# Patient Record
Sex: FEMALE | Race: WHITE | NOT HISPANIC OR LATINO | Employment: FULL TIME | URBAN - METROPOLITAN AREA
[De-identification: names, ages, dates, MRNs, and addresses within clinical notes are randomized per-mention and may not be internally consistent; named-entity substitution may affect disease eponyms.]

---

## 2020-06-09 ENCOUNTER — OCCMED (OUTPATIENT)
Dept: URGENT CARE | Facility: CLINIC | Age: 25
End: 2020-06-09

## 2020-06-09 ENCOUNTER — APPOINTMENT (OUTPATIENT)
Dept: LAB | Facility: CLINIC | Age: 25
End: 2020-06-09

## 2020-06-09 ENCOUNTER — TRANSCRIBE ORDERS (OUTPATIENT)
Dept: ADMINISTRATIVE | Facility: HOSPITAL | Age: 25
End: 2020-06-09

## 2020-06-09 DIAGNOSIS — Z00.00 ENCOUNTER FOR ANNUAL GENERAL MEDICAL EXAMINATION WITHOUT ABNORMAL FINDINGS IN ADULT: Primary | ICD-10-CM

## 2020-06-09 DIAGNOSIS — Z02.1 PHYSICAL EXAM, PRE-EMPLOYMENT: Primary | ICD-10-CM

## 2020-06-09 DIAGNOSIS — Z02.1 PHYSICAL EXAM, PRE-EMPLOYMENT: ICD-10-CM

## 2020-06-09 PROCEDURE — 86480 TB TEST CELL IMMUN MEASURE: CPT

## 2020-06-09 PROCEDURE — 36415 COLL VENOUS BLD VENIPUNCTURE: CPT

## 2020-06-11 LAB
GAMMA INTERFERON BACKGROUND BLD IA-ACNC: 0.03 IU/ML
M TB IFN-G BLD-IMP: NEGATIVE
M TB IFN-G CD4+ BCKGRND COR BLD-ACNC: 0 IU/ML
M TB IFN-G CD4+ BCKGRND COR BLD-ACNC: 0 IU/ML
MITOGEN IGNF BCKGRD COR BLD-ACNC: >10 IU/ML

## 2020-11-20 DIAGNOSIS — Z20.822 EXPOSURE TO COVID-19 VIRUS: Primary | ICD-10-CM

## 2020-12-21 ENCOUNTER — IMMUNIZATIONS (OUTPATIENT)
Dept: FAMILY MEDICINE CLINIC | Facility: HOSPITAL | Age: 25
End: 2020-12-21

## 2020-12-21 DIAGNOSIS — Z23 ENCOUNTER FOR IMMUNIZATION: ICD-10-CM

## 2020-12-21 PROCEDURE — 91300 SARS-COV-2 / COVID-19 MRNA VACCINE (PFIZER-BIONTECH) 30 MCG: CPT

## 2020-12-21 PROCEDURE — 0001A SARS-COV-2 / COVID-19 MRNA VACCINE (PFIZER-BIONTECH) 30 MCG: CPT

## 2021-01-14 ENCOUNTER — IMMUNIZATIONS (OUTPATIENT)
Dept: FAMILY MEDICINE CLINIC | Facility: HOSPITAL | Age: 26
End: 2021-01-14

## 2021-01-14 DIAGNOSIS — Z23 ENCOUNTER FOR IMMUNIZATION: Primary | ICD-10-CM

## 2021-01-14 PROCEDURE — 0002A SARS-COV-2 / COVID-19 MRNA VACCINE (PFIZER-BIONTECH) 30 MCG: CPT

## 2021-01-14 PROCEDURE — 91300 SARS-COV-2 / COVID-19 MRNA VACCINE (PFIZER-BIONTECH) 30 MCG: CPT

## 2021-08-19 ENCOUNTER — APPOINTMENT (OUTPATIENT)
Dept: LAB | Facility: HOSPITAL | Age: 26
End: 2021-08-19

## 2021-08-19 DIAGNOSIS — Z00.8 HEALTH EXAMINATION IN POPULATION SURVEY: ICD-10-CM

## 2021-08-19 LAB
CHOLEST SERPL-MCNC: 178 MG/DL
EST. AVERAGE GLUCOSE BLD GHB EST-MCNC: 105 MG/DL
HBA1C MFR BLD: 5.3 %
HDLC SERPL-MCNC: 72 MG/DL
LDLC SERPL CALC-MCNC: 92 MG/DL
NONHDLC SERPL-MCNC: 106 MG/DL
TRIGL SERPL-MCNC: 68 MG/DL

## 2021-08-19 PROCEDURE — 36415 COLL VENOUS BLD VENIPUNCTURE: CPT

## 2021-08-19 PROCEDURE — 80061 LIPID PANEL: CPT

## 2021-08-19 PROCEDURE — 83036 HEMOGLOBIN GLYCOSYLATED A1C: CPT

## 2022-06-07 ENCOUNTER — HOSPITAL ENCOUNTER (EMERGENCY)
Facility: HOSPITAL | Age: 27
Discharge: HOME | End: 2022-06-07
Attending: EMERGENCY MEDICINE | Admitting: EMERGENCY MEDICINE

## 2022-06-07 VITALS
OXYGEN SATURATION: 100 % | HEART RATE: 110 BPM | HEIGHT: 65 IN | SYSTOLIC BLOOD PRESSURE: 137 MMHG | WEIGHT: 205 LBS | RESPIRATION RATE: 18 BRPM | TEMPERATURE: 97.6 F | BODY MASS INDEX: 34.16 KG/M2 | DIASTOLIC BLOOD PRESSURE: 89 MMHG

## 2022-06-07 DIAGNOSIS — R11.10 VOMITING: Primary | ICD-10-CM

## 2022-06-07 LAB
ALBUMIN SERPL BCP-MCNC: 4.8 G/DL (ref 3–5.2)
ALP SERPL-CCNC: 109 U/L (ref 43–122)
ALT SERPL W P-5'-P-CCNC: 33 U/L
ANION GAP SERPL CALCULATED.3IONS-SCNC: 9 MMOL/L (ref 5–14)
AST SERPL W P-5'-P-CCNC: 33 U/L (ref 14–36)
BASOPHILS # BLD AUTO: 0.02 THOUSANDS/ΜL (ref 0–0.1)
BASOPHILS NFR BLD AUTO: 0 % (ref 0–1)
BILIRUB SERPL-MCNC: 0.55 MG/DL
BUN SERPL-MCNC: 8 MG/DL (ref 5–25)
CALCIUM SERPL-MCNC: 9.4 MG/DL (ref 8.4–10.2)
CHLORIDE SERPL-SCNC: 104 MMOL/L (ref 97–108)
CO2 SERPL-SCNC: 26 MMOL/L (ref 22–30)
CREAT SERPL-MCNC: 0.65 MG/DL (ref 0.6–1.2)
EOSINOPHIL # BLD AUTO: 0.05 THOUSAND/ΜL (ref 0–0.61)
EOSINOPHIL NFR BLD AUTO: 0 % (ref 0–6)
ERYTHROCYTE [DISTWIDTH] IN BLOOD BY AUTOMATED COUNT: 13.1 % (ref 11.6–15.1)
GFR SERPL CREATININE-BSD FRML MDRD: 121 ML/MIN/1.73SQ M
GLUCOSE SERPL-MCNC: 101 MG/DL (ref 70–99)
HCT VFR BLD AUTO: 46.4 % (ref 34.8–46.1)
HGB BLD-MCNC: 15.2 G/DL (ref 11.5–15.4)
IMM GRANULOCYTES # BLD AUTO: 0.02 THOUSAND/UL (ref 0–0.2)
IMM GRANULOCYTES NFR BLD AUTO: 0 % (ref 0–2)
LIPASE SERPL-CCNC: 80 U/L (ref 23–300)
LYMPHOCYTES # BLD AUTO: 3.24 THOUSANDS/ΜL (ref 0.6–4.47)
LYMPHOCYTES NFR BLD AUTO: 27 % (ref 14–44)
MCH RBC QN AUTO: 29.8 PG (ref 26.8–34.3)
MCHC RBC AUTO-ENTMCNC: 32.8 G/DL (ref 31.4–37.4)
MCV RBC AUTO: 91 FL (ref 82–98)
MONOCYTES # BLD AUTO: 0.65 THOUSAND/ΜL (ref 0.17–1.22)
MONOCYTES NFR BLD AUTO: 5 % (ref 4–12)
NEUTROPHILS # BLD AUTO: 8.2 THOUSANDS/ΜL (ref 1.85–7.62)
NEUTS SEG NFR BLD AUTO: 68 % (ref 43–75)
NRBC BLD AUTO-RTO: 0 /100 WBCS
PLATELET # BLD AUTO: 400 THOUSANDS/UL (ref 149–390)
PMV BLD AUTO: 10.2 FL (ref 8.9–12.7)
POTASSIUM SERPL-SCNC: 3.9 MMOL/L (ref 3.6–5)
PROT SERPL-MCNC: 8.9 G/DL (ref 5.9–8.4)
RBC # BLD AUTO: 5.1 MILLION/UL (ref 3.81–5.12)
SODIUM SERPL-SCNC: 139 MMOL/L (ref 137–147)
WBC # BLD AUTO: 12.18 THOUSAND/UL (ref 4.31–10.16)

## 2022-06-07 PROCEDURE — 85025 COMPLETE CBC W/AUTO DIFF WBC: CPT

## 2022-06-07 PROCEDURE — 80053 COMPREHEN METABOLIC PANEL: CPT

## 2022-06-07 PROCEDURE — 96374 THER/PROPH/DIAG INJ IV PUSH: CPT

## 2022-06-07 PROCEDURE — 99284 EMERGENCY DEPT VISIT MOD MDM: CPT

## 2022-06-07 PROCEDURE — 99283 EMERGENCY DEPT VISIT LOW MDM: CPT

## 2022-06-07 PROCEDURE — 36415 COLL VENOUS BLD VENIPUNCTURE: CPT

## 2022-06-07 PROCEDURE — 83690 ASSAY OF LIPASE: CPT

## 2022-06-07 RX ORDER — ONDANSETRON 2 MG/ML
4 INJECTION INTRAMUSCULAR; INTRAVENOUS ONCE
Status: COMPLETED | OUTPATIENT
Start: 2022-06-07 | End: 2022-06-07

## 2022-06-07 RX ORDER — ONDANSETRON 4 MG/1
4 TABLET, ORALLY DISINTEGRATING ORAL ONCE
Status: DISCONTINUED | OUTPATIENT
Start: 2022-06-07 | End: 2022-06-07

## 2022-06-07 RX ADMIN — ONDANSETRON 4 MG: 2 INJECTION INTRAMUSCULAR; INTRAVENOUS at 20:50

## 2022-06-08 NOTE — ED ATTENDING ATTESTATION
I was the attending physician on duty at the time the patient visited the emergency department  The patient was evaluated and dispositioned by the APC  I was personally available for consultation  I am administratively signing the chart after the fact      Kisha Villegas MD

## 2022-09-02 ENCOUNTER — APPOINTMENT (OUTPATIENT)
Dept: LAB | Facility: HOSPITAL | Age: 27
End: 2022-09-02

## 2022-09-02 DIAGNOSIS — Z00.8 ENCOUNTER FOR OTHER GENERAL EXAMINATION: ICD-10-CM

## 2022-09-02 LAB
CHOLEST SERPL-MCNC: 200 MG/DL
EST. AVERAGE GLUCOSE BLD GHB EST-MCNC: 111 MG/DL
HBA1C MFR BLD: 5.5 %
HDLC SERPL-MCNC: 71 MG/DL
LDLC SERPL CALC-MCNC: 108 MG/DL
NONHDLC SERPL-MCNC: 129 MG/DL
TRIGL SERPL-MCNC: 106 MG/DL

## 2022-09-02 PROCEDURE — 36415 COLL VENOUS BLD VENIPUNCTURE: CPT

## 2022-09-02 PROCEDURE — 83036 HEMOGLOBIN GLYCOSYLATED A1C: CPT

## 2022-09-02 PROCEDURE — 80061 LIPID PANEL: CPT

## 2023-08-30 ENCOUNTER — APPOINTMENT (OUTPATIENT)
Dept: LAB | Facility: HOSPITAL | Age: 28
End: 2023-08-30

## 2023-08-30 DIAGNOSIS — Z00.8 ENCOUNTER FOR OTHER GENERAL EXAMINATION: ICD-10-CM

## 2023-08-30 LAB
CHOLEST SERPL-MCNC: 159 MG/DL
EST. AVERAGE GLUCOSE BLD GHB EST-MCNC: 123 MG/DL
HBA1C MFR BLD: 5.9 %
HDLC SERPL-MCNC: 71 MG/DL
LDLC SERPL CALC-MCNC: 67 MG/DL (ref 0–100)
NONHDLC SERPL-MCNC: 88 MG/DL
TRIGL SERPL-MCNC: 105 MG/DL

## 2023-08-30 PROCEDURE — 80061 LIPID PANEL: CPT

## 2023-08-30 PROCEDURE — 36415 COLL VENOUS BLD VENIPUNCTURE: CPT

## 2023-08-30 PROCEDURE — 83036 HEMOGLOBIN GLYCOSYLATED A1C: CPT

## 2023-09-27 ENCOUNTER — TELEPHONE (OUTPATIENT)
Dept: BARIATRICS | Facility: CLINIC | Age: 28
End: 2023-09-27

## 2023-09-27 NOTE — TELEPHONE ENCOUNTER
Called patient and left a message to give the office a call back to schedule a NP appointment. She was referred by [facility] per her request. Referral for Weight Management has been scanned into her Chart.

## 2023-11-10 ENCOUNTER — OFFICE VISIT (OUTPATIENT)
Dept: BARIATRICS | Facility: CLINIC | Age: 28
End: 2023-11-10
Payer: COMMERCIAL

## 2023-11-10 VITALS
HEART RATE: 91 BPM | BODY MASS INDEX: 41.22 KG/M2 | HEIGHT: 62 IN | WEIGHT: 224 LBS | SYSTOLIC BLOOD PRESSURE: 116 MMHG | RESPIRATION RATE: 16 BRPM | DIASTOLIC BLOOD PRESSURE: 87 MMHG

## 2023-11-10 DIAGNOSIS — E66.01 OBESITY, CLASS III, BMI 40-49.9 (MORBID OBESITY) (HCC): Primary | ICD-10-CM

## 2023-11-10 DIAGNOSIS — R73.03 PREDIABETES: ICD-10-CM

## 2023-11-10 DIAGNOSIS — F90.9 ADHD: ICD-10-CM

## 2023-11-10 PROBLEM — E66.813 OBESITY, CLASS III, BMI 40-49.9 (MORBID OBESITY): Status: ACTIVE | Noted: 2023-11-10

## 2023-11-10 PROBLEM — J45.909 ASTHMA: Status: ACTIVE | Noted: 2023-11-10

## 2023-11-10 PROCEDURE — 99204 OFFICE O/P NEW MOD 45 MIN: CPT | Performed by: PHYSICIAN ASSISTANT

## 2023-11-10 RX ORDER — HYDROXYZINE HYDROCHLORIDE 10 MG/1
TABLET, FILM COATED ORAL
COMMUNITY

## 2023-11-10 RX ORDER — AMOXICILLIN 500 MG/1
500 CAPSULE ORAL 2 TIMES DAILY
COMMUNITY
Start: 2023-11-05 | End: 2023-11-15

## 2023-11-10 RX ORDER — DEXTROAMPHETAMINE SACCHARATE, AMPHETAMINE ASPARTATE, DEXTROAMPHETAMINE SULFATE AND AMPHETAMINE SULFATE 5; 5; 5; 5 MG/1; MG/1; MG/1; MG/1
TABLET ORAL
COMMUNITY
Start: 2016-01-01

## 2023-11-10 RX ORDER — CETIRIZINE HYDROCHLORIDE 10 MG/1
10 TABLET ORAL DAILY
COMMUNITY
Start: 2019-02-20

## 2023-11-10 RX ORDER — OMEGA-3S/DHA/EPA/FISH OIL/D3 300MG-1000
CAPSULE ORAL DAILY
COMMUNITY

## 2023-11-10 RX ORDER — METFORMIN HYDROCHLORIDE 750 MG/1
TABLET, EXTENDED RELEASE ORAL
Qty: 60 TABLET | Refills: 2 | Status: SHIPPED | OUTPATIENT
Start: 2023-11-10

## 2023-11-10 RX ORDER — MOMETASONE FUROATE 1 MG/ML
1 SOLUTION TOPICAL DAILY
COMMUNITY
Start: 2023-10-31 | End: 2024-10-30

## 2023-11-10 NOTE — ASSESSMENT & PLAN NOTE
-Discussed options of HealthyCORE-Intensive Lifestyle Intervention Program, Very Low Calorie Diet-VLCD, Conservative Program, Evangelist-En-Y Gastric Bypass, and Vertical Sleeve Gastrectomy and the role of weight loss medications. Explained the importance of making lifestyle changes if utilizing medication to aid in weight loss  -Initial weight loss goal of 5-10% weight loss for improved health  -Screening labs and records reviewed from prior  - STOP BANG-2/8    -Patient is interested in pursuing conservative plan    Goals:  -Food log (ie.) [email or web address],[employer],Andre Phillipe-[email or web address]  - To drink at least 64oz of water daily. No sugary beverages.  -Increase physical activity by 10 minutes daily. Gradually increase physical activity to a goal of 5 days per week for 30 minutes of MODERATE intensity PLUS 2 days per week of FULL BODY resistance training    Discussed medication options and will need to avoid phentermine due to adderall use. She was on wellbutrin prior and had side effects. To start on metformin for weight loss and prediabetes. Side effects discussed.      Initial [id number]   Goal Weight:145lb

## 2024-01-02 DIAGNOSIS — Z00.6 ENCOUNTER FOR EXAMINATION FOR NORMAL COMPARISON OR CONTROL IN CLINICAL RESEARCH PROGRAM: ICD-10-CM

## 2024-03-08 ENCOUNTER — TELEPHONE (OUTPATIENT)
Dept: FAMILY MEDICINE CLINIC | Facility: CLINIC | Age: 29
End: 2024-03-08

## 2024-03-08 ENCOUNTER — APPOINTMENT (OUTPATIENT)
Dept: LAB | Age: 29
End: 2024-03-08
Payer: COMMERCIAL

## 2024-03-08 ENCOUNTER — OFFICE VISIT (OUTPATIENT)
Dept: FAMILY MEDICINE CLINIC | Facility: CLINIC | Age: 29
End: 2024-03-08
Payer: COMMERCIAL

## 2024-03-08 VITALS
RESPIRATION RATE: 16 BRPM | SYSTOLIC BLOOD PRESSURE: 114 MMHG | DIASTOLIC BLOOD PRESSURE: 60 MMHG | OXYGEN SATURATION: 99 % | HEIGHT: 62 IN | HEART RATE: 84 BPM | BODY MASS INDEX: 41.77 KG/M2 | TEMPERATURE: 97.7 F | WEIGHT: 227 LBS

## 2024-03-08 DIAGNOSIS — R05.1 ACUTE COUGH: ICD-10-CM

## 2024-03-08 DIAGNOSIS — F90.0 ATTENTION DEFICIT HYPERACTIVITY DISORDER (ADHD), PREDOMINANTLY INATTENTIVE TYPE: ICD-10-CM

## 2024-03-08 DIAGNOSIS — R73.9 HYPERGLYCEMIA: ICD-10-CM

## 2024-03-08 DIAGNOSIS — E66.01 CLASS 3 SEVERE OBESITY DUE TO EXCESS CALORIES WITHOUT SERIOUS COMORBIDITY WITH BODY MASS INDEX (BMI) OF 40.0 TO 44.9 IN ADULT (HCC): ICD-10-CM

## 2024-03-08 DIAGNOSIS — F33.1 MDD (MAJOR DEPRESSIVE DISORDER), RECURRENT EPISODE, MODERATE (HCC): ICD-10-CM

## 2024-03-08 DIAGNOSIS — E53.8 VITAMIN B12 DEFICIENCY: ICD-10-CM

## 2024-03-08 DIAGNOSIS — Z00.01 ABNORMAL WELLNESS EXAM: ICD-10-CM

## 2024-03-08 DIAGNOSIS — F41.9 ANXIETY: ICD-10-CM

## 2024-03-08 DIAGNOSIS — Z00.6 ENCOUNTER FOR EXAMINATION FOR NORMAL COMPARISON OR CONTROL IN CLINICAL RESEARCH PROGRAM: ICD-10-CM

## 2024-03-08 DIAGNOSIS — R73.03 PREDIABETES: ICD-10-CM

## 2024-03-08 DIAGNOSIS — R11.0 NAUSEA: ICD-10-CM

## 2024-03-08 DIAGNOSIS — F90.0 ATTENTION DEFICIT HYPERACTIVITY DISORDER (ADHD), PREDOMINANTLY INATTENTIVE TYPE: Primary | ICD-10-CM

## 2024-03-08 DIAGNOSIS — J30.89 NON-SEASONAL ALLERGIC RHINITIS DUE TO OTHER ALLERGIC TRIGGER: ICD-10-CM

## 2024-03-08 PROBLEM — J30.2 SEASONAL ALLERGIES: Status: ACTIVE | Noted: 2023-03-08

## 2024-03-08 PROBLEM — E66.813 CLASS 3 SEVERE OBESITY DUE TO EXCESS CALORIES WITHOUT SERIOUS COMORBIDITY WITH BODY MASS INDEX (BMI) OF 40.0 TO 44.9 IN ADULT (HCC): Status: ACTIVE | Noted: 2024-03-08

## 2024-03-08 LAB
ALBUMIN SERPL BCP-MCNC: 4.5 G/DL (ref 3.5–5)
ALP SERPL-CCNC: 100 U/L (ref 34–104)
ALT SERPL W P-5'-P-CCNC: 27 U/L (ref 7–52)
ANION GAP SERPL CALCULATED.3IONS-SCNC: 9 MMOL/L
AST SERPL W P-5'-P-CCNC: 21 U/L (ref 13–39)
BASOPHILS # BLD AUTO: 0.06 THOUSANDS/ÂΜL (ref 0–0.1)
BASOPHILS NFR BLD AUTO: 1 % (ref 0–1)
BILIRUB SERPL-MCNC: 0.74 MG/DL (ref 0.2–1)
BUN SERPL-MCNC: 7 MG/DL (ref 5–25)
CALCIUM SERPL-MCNC: 9.6 MG/DL (ref 8.4–10.2)
CHLORIDE SERPL-SCNC: 102 MMOL/L (ref 96–108)
CHOLEST SERPL-MCNC: 153 MG/DL
CO2 SERPL-SCNC: 27 MMOL/L (ref 21–32)
CREAT SERPL-MCNC: 0.73 MG/DL (ref 0.6–1.3)
EOSINOPHIL # BLD AUTO: 0.15 THOUSAND/ÂΜL (ref 0–0.61)
EOSINOPHIL NFR BLD AUTO: 1 % (ref 0–6)
ERYTHROCYTE [DISTWIDTH] IN BLOOD BY AUTOMATED COUNT: 13.1 % (ref 11.6–15.1)
EST. AVERAGE GLUCOSE BLD GHB EST-MCNC: 126 MG/DL
GFR SERPL CREATININE-BSD FRML MDRD: 111 ML/MIN/1.73SQ M
GLUCOSE P FAST SERPL-MCNC: 87 MG/DL (ref 65–99)
HBA1C MFR BLD: 6 %
HCT VFR BLD AUTO: 40.2 % (ref 34.8–46.1)
HDLC SERPL-MCNC: 73 MG/DL
HGB BLD-MCNC: 13.4 G/DL (ref 11.5–15.4)
IMM GRANULOCYTES # BLD AUTO: 0.03 THOUSAND/UL (ref 0–0.2)
IMM GRANULOCYTES NFR BLD AUTO: 0 % (ref 0–2)
INSULIN SERPL-ACNC: 6.4 UIU/ML (ref 1.9–23)
LDLC SERPL CALC-MCNC: 68 MG/DL (ref 0–100)
LYMPHOCYTES # BLD AUTO: 3.96 THOUSANDS/ÂΜL (ref 0.6–4.47)
LYMPHOCYTES NFR BLD AUTO: 31 % (ref 14–44)
MCH RBC QN AUTO: 29.5 PG (ref 26.8–34.3)
MCHC RBC AUTO-ENTMCNC: 33.3 G/DL (ref 31.4–37.4)
MCV RBC AUTO: 89 FL (ref 82–98)
MONOCYTES # BLD AUTO: 1.09 THOUSAND/ÂΜL (ref 0.17–1.22)
MONOCYTES NFR BLD AUTO: 9 % (ref 4–12)
NEUTROPHILS # BLD AUTO: 7.51 THOUSANDS/ÂΜL (ref 1.85–7.62)
NEUTS SEG NFR BLD AUTO: 58 % (ref 43–75)
NRBC BLD AUTO-RTO: 0 /100 WBCS
PLATELET # BLD AUTO: 445 THOUSANDS/UL (ref 149–390)
PMV BLD AUTO: 10.7 FL (ref 8.9–12.7)
POTASSIUM SERPL-SCNC: 3.5 MMOL/L (ref 3.5–5.3)
PROT SERPL-MCNC: 7.9 G/DL (ref 6.4–8.4)
RBC # BLD AUTO: 4.54 MILLION/UL (ref 3.81–5.12)
SODIUM SERPL-SCNC: 138 MMOL/L (ref 135–147)
TRIGL SERPL-MCNC: 59 MG/DL
TSH SERPL DL<=0.05 MIU/L-ACNC: 2.95 UIU/ML (ref 0.45–4.5)
VIT B12 SERPL-MCNC: 602 PG/ML (ref 180–914)
WBC # BLD AUTO: 12.8 THOUSAND/UL (ref 4.31–10.16)

## 2024-03-08 PROCEDURE — 99395 PREV VISIT EST AGE 18-39: CPT | Performed by: FAMILY MEDICINE

## 2024-03-08 PROCEDURE — 80061 LIPID PANEL: CPT

## 2024-03-08 PROCEDURE — 80053 COMPREHEN METABOLIC PANEL: CPT

## 2024-03-08 PROCEDURE — 82607 VITAMIN B-12: CPT

## 2024-03-08 PROCEDURE — 84443 ASSAY THYROID STIM HORMONE: CPT

## 2024-03-08 PROCEDURE — 36415 COLL VENOUS BLD VENIPUNCTURE: CPT

## 2024-03-08 PROCEDURE — 99204 OFFICE O/P NEW MOD 45 MIN: CPT | Performed by: FAMILY MEDICINE

## 2024-03-08 PROCEDURE — 83525 ASSAY OF INSULIN: CPT

## 2024-03-08 PROCEDURE — 83036 HEMOGLOBIN GLYCOSYLATED A1C: CPT

## 2024-03-08 PROCEDURE — 85025 COMPLETE CBC W/AUTO DIFF WBC: CPT

## 2024-03-08 RX ORDER — DEXTROAMPHETAMINE SACCHARATE, AMPHETAMINE ASPARTATE MONOHYDRATE, DEXTROAMPHETAMINE SULFATE AND AMPHETAMINE SULFATE 5; 5; 5; 5 MG/1; MG/1; MG/1; MG/1
20 CAPSULE, EXTENDED RELEASE ORAL EVERY MORNING
COMMUNITY
Start: 2024-01-31 | End: 2024-03-08 | Stop reason: SDUPTHER

## 2024-03-08 RX ORDER — DEXTROAMPHETAMINE SACCHARATE, AMPHETAMINE ASPARTATE MONOHYDRATE, DEXTROAMPHETAMINE SULFATE AND AMPHETAMINE SULFATE 5; 5; 5; 5 MG/1; MG/1; MG/1; MG/1
20 CAPSULE, EXTENDED RELEASE ORAL EVERY MORNING
Qty: 30 CAPSULE | Refills: 0 | Status: SHIPPED | OUTPATIENT
Start: 2024-03-08 | End: 2024-03-08 | Stop reason: SDUPTHER

## 2024-03-08 RX ORDER — DEXTROAMPHETAMINE SACCHARATE, AMPHETAMINE ASPARTATE, DEXTROAMPHETAMINE SULFATE AND AMPHETAMINE SULFATE 5; 5; 5; 5 MG/1; MG/1; MG/1; MG/1
TABLET ORAL
Qty: 30 TABLET | Refills: 0 | Status: SHIPPED | OUTPATIENT
Start: 2024-03-08 | End: 2024-03-08 | Stop reason: SDUPTHER

## 2024-03-08 RX ORDER — FLUTICASONE PROPIONATE 220 UG/1
2 AEROSOL, METERED RESPIRATORY (INHALATION) 2 TIMES DAILY
COMMUNITY
Start: 2023-12-05 | End: 2024-03-08 | Stop reason: ALTCHOICE

## 2024-03-08 RX ORDER — AZITHROMYCIN 500 MG/1
500 TABLET, FILM COATED ORAL DAILY
Qty: 3 TABLET | Refills: 0 | Status: SHIPPED | OUTPATIENT
Start: 2024-03-08 | End: 2024-03-11

## 2024-03-08 RX ORDER — ONDANSETRON 4 MG/1
4 TABLET, FILM COATED ORAL EVERY 8 HOURS PRN
Qty: 20 TABLET | Refills: 0 | Status: SHIPPED | OUTPATIENT
Start: 2024-03-08

## 2024-03-08 RX ORDER — EPINEPHRINE 0.3 MG/.3ML
0.3 INJECTION INTRAMUSCULAR ONCE
COMMUNITY
Start: 2023-12-05

## 2024-03-08 RX ORDER — AZITHROMYCIN 500 MG/1
500 TABLET, FILM COATED ORAL DAILY
COMMUNITY
End: 2024-03-08 | Stop reason: SDUPTHER

## 2024-03-08 RX ORDER — HYDROXYZINE HYDROCHLORIDE 10 MG/1
10 TABLET, FILM COATED ORAL EVERY 6 HOURS PRN
Qty: 30 TABLET | Refills: 3 | Status: SHIPPED | OUTPATIENT
Start: 2024-03-08

## 2024-03-08 NOTE — ASSESSMENT & PLAN NOTE
She was told to use Flonase nasal spray and take Zyrtec in a.m. and Atarax at bedtime.  I will consider adding Astelin or Singulair if needed.

## 2024-03-08 NOTE — TELEPHONE ENCOUNTER
Pt seen today and I copied past refills into chart from pdmp website. Pt requesting it be sent to different pharmacy.  Sent to provider for approval    ts      SELECT PATIENT ID NAME  [facility]   [] 1 BRENNON PITTSR 1995 F [id number] [facility]           Search Criteria    NAME DATE OF BIRTH DATE RANGE   brennon pittsr 1995 To 2024     REQUESTER NAME REQUESTED DATE   WASSIM BUZZSAMRA Fri Mar 08 2024 13:02:55 GMT-0500 (Eastern Standard Time)     Summary  Prescriptions  6  Prescribers  2  Pharmacies  1  View Map  Drug Classes  Benzodiazepines  0  Stimulants  5  Opioids  0  Muscle Relaxants  0  Opioid Dosage  Total MME for Active Prescriptions  0    Average MME  0.00  MME Graph   MME Calculator     Prescriptions  Notifications    Prescribers  Pharmacies  MME Graph    [] [place] Drug Categories:     [] Stimulants      [] Others      Showentries  Search:  PATIENT ID PRESCRIPTION # FILLED WRITTEN DRUG LABEL QTY DAYS STRENGTH MME** PRESCRIBER PHARMACY PAYMENT REFILL #/AUTH STATE DETAIL   1 [id number] 2024 Mixed Amphetamine Salt (Capsule, Extended Release) 30.0 30 20 MG NA [facility] Commercial [place] 0 / 0 [place]    1 [id number] 2023 Mixed Amphetamine Salts (Capsule, Extended Release) 30.0 30 20 MG NA [facility] [place] 0 / 0 [place]    1 [id number] 10/23/2023 10/23/2023  30.0 30  NA [facility] [place] 0 / 0 [place]    1 [id number] 2023 Mixed Amphetamine Salts (Capsule, Extended Release) 30.0 30 20 MG NA [facility] [place] 0 / 0 [place]    1 [id number] 2023 Mixed Amphetamine Salts (Capsule, Extended Release) 30.0 30 20 MG NA [facility] Commercial [place] 0 / 0 [place]    1 [id number] 2023 Adderall Xr (Capsule, Extended Release) 30.0  30 20 MG NA [facility] Commercial [place] 0 / 0 [place]    Showing 1 to 6 of 6 entries  [id number]     * Per CDC guidance, the conversion factors and associated daily morphine milligram equivalents for drugs prescribed as part of medication-assisted treatment for opioid use disorder should not be used to benchmark against dosage thresholds meant for opioids prescribed for pain.  Report Disclaimer:  Information from the [place] Prescription Drug Monitoring Program (PDMP) database is protected health information and any information accessed must be treated as confidential. Any person who knowingly or intentionally makes an unauthorized disclosure of information from the PDMP database will be subject to civil and criminal penalties as set forth in the ABC-MAP Act 191 of 2014; Act of Oct. 27, 2014, P.L. 2911, No. 191.    The information in the PDMP database is submitted by pharmacies and may contain errors and omissions. Additionally, pharmacies may submit extraneous non-controlled substance dispensations to the PDMP database; if a non-controlled substance is present on one patient’s Prescription Report, it should not be assumed that this same medication will be reported on another patient’s Prescription Report. Independent verification of prescription information with pharmacies and prescribers may sometimes be prudent or necessary.  Educational content  CDC MME calculation guidelines  [place] Prescribing Guidelines  Letter Regarding the Misapplication of Prescribing Guidelines   Guide for Appropriate Tapering or Discontinuation of Long-Term Opioid Use   #[id number]

## 2024-03-08 NOTE — PROGRESS NOTES
Name: Christel Murray      : 1995      MRN: [id number]  Encounter Provider: Kaity Echevarria MD  Encounter Date: 3/8/2024   Encounter department: [facility]    Assessment & Plan     1. Attention deficit hyperactivity disorder (ADHD), predominantly inattentive type  Assessment & Plan:  Not well-controlled.  I am going to add Adderall 20 mg short acting to be taken in the afternoon.  Continue Adderall XR 20 mg in a.m.  Will continue to monitor.  Come back in 3 to 4 weeks.    Orders:  -     amphetamine-dextroamphetamine (ADDERALL XR) 20 MG 24 hr capsule; Take 1 capsule (20 mg total) by mouth every morning Max Daily Amount: 20 mg  -     amphetamine-dextroamphetamine (ADDERALL, 20MG,) 20 mg tablet; One tab in the afternoon    2. Anxiety  Assessment & Plan:  Continue Atarax as needed.    Orders:  -     hydrOXYzine HCL (ATARAX) 10 mg tablet; Take 1 tablet (10 mg total) by mouth every 6 (six) hours as needed for itching    3. Abnormal wellness exam  Assessment & Plan:  It was discussed about immunizations, diet, exercise and safety measures.    Orders:  -     CBC and differential; Future  -     Comprehensive metabolic panel; Future  -     Lipid Panel with Direct LDL reflex; Future  -     TSH, 3rd generation with Free T4 reflex; Future    4. Hyperglycemia  -     Hemoglobin A1C; Future    5. Class 3 severe obesity due to excess calories without serious comorbidity with body mass index (BMI) of 40.0 to 44.9 in adult (HCC)  Assessment & Plan:  He was given sample of Ozempic 0.25 mg weekly for week 1 and 2 and then 0.5 mg weekly.  I sent prescription for Wegovy 1 mg weekly.  It was discussed about possible side effects.  Discussed about low-carb diet and regular exercise.    Orders:  -     Insulin, fasting; Future  -     Semaglutide-Weight Management (WEGOVY) 1 MG/0.5ML; Inject 0.5 mL (1 mg total) under the skin once a week    6. Vitamin B12 deficiency  -     Vitamin B12; Future    7. Acute  cough  Assessment & Plan:  She was given prescription for Zithromax.    Orders:  -     azithromycin (ZITHROMAX) 500 MG tablet; Take 1 tablet (500 mg total) by mouth daily for 3 days    8. Nausea  -     ondansetron (ZOFRAN) 4 mg tablet; Take 1 tablet (4 mg total) by mouth every 8 (eight) hours as needed for nausea or vomiting    9. MDD (major depressive disorder), recurrent episode, moderate (HCC)  Assessment & Plan:  Well-controlled without medications.  We will continue to monitor.      10. Prediabetes  Assessment & Plan:  Her A1c was slightly elevated at 5.8.  Discussed about low-carb diet and regular exercise.  She was told Wegovy will help.      11. Non-seasonal allergic rhinitis due to other allergic trigger  Assessment & Plan:  She was told to use Flonase nasal spray and take Zyrtec in a.m. and Atarax at bedtime.  I will consider adding Astelin or Singulair if needed.             Subjective     She is here today as a new patient to establish and follow-up multiple medical problems.  She has history of ADHD and she is on Adderall.  She stated she was prescribed Adderall XR 20 mg daily and that seems to help but does not last long enough.  She also has history of depression and anxiety but she has been doing okay without medication.  She was on Paxil but that caused a lot of side effects.  He has been trying to lose weight but has been difficult.    Cough  This is a chronic problem. The current episode started more than 1 month ago. The problem has been waxing and waning. The problem occurs every few minutes. The cough is Non-productive. Pertinent negatives include no chest pain, chills, ear congestion, ear pain, fever, headaches, heartburn, hemoptysis, myalgias, nasal congestion, postnasal drip, rash, rhinorrhea, sore throat, shortness of breath, sweats, weight loss or wheezing. The symptoms are aggravated by lying down. Risk factors for lung disease include animal exposure and occupational exposure.     Review  of Systems   Constitutional:  Negative for chills, fever and weight loss.   HENT:  Negative for ear pain, postnasal drip, rhinorrhea and sore throat.    Respiratory:  Positive for cough. Negative for hemoptysis, shortness of breath and wheezing.    Cardiovascular:  Negative for chest pain.   Gastrointestinal:  Negative for heartburn.   Musculoskeletal:  Negative for myalgias.   Skin:  Negative for rash.   Neurological:  Negative for headaches.   Psychiatric/Behavioral:  The patient is nervous/anxious.        History reviewed. No pertinent past medical history.  Past Surgical History:   Procedure Laterality Date   • TONSILLECTOMY     • WISDOM TOOTH EXTRACTION       Family History   Problem Relation Age of Onset   • ADD / ADHD Mother    • Spina bifida Mother      Social History     Socioeconomic History   • Marital status: Single     Spouse name: None   • Number of children: None   • Years of education: None   • Highest education level: None   Occupational History   • None   Tobacco Use   • Smoking status: Never   • Smokeless tobacco: Never   Vaping Use   • Vaping status: Never Used   Substance and Sexual Activity   • Alcohol use: Never   • Drug use: Never   • Sexual activity: None   Other Topics Concern   • None   Social History Narrative   • None     Social Determinants of Health     Financial Resource Strain: Not on file   Food Insecurity: Not on file   Transportation Needs: Not on file   Physical Activity: Not on file   Stress: Not on file   Social Connections: Not on file   Intimate Partner Violence: Not on file   Housing Stability: Not on file     Current Outpatient Medications on File Prior to Visit   Medication Sig   • Albuterol Sulfate 108 (90 Base) MCG/ACT AEPB    • cetirizine (ZyrTEC) 10 mg tablet Take 10 mg by mouth daily   • cholecalciferol (VITAMIN D3) 400 units tablet Take by mouth daily   • EpiPen 2-Liang 0.3 MG/0.3ML SOAJ Inject 0.3 mg into a muscle once   • PARAGARD INTRAUTERINE COPPER IU    •  [DISCONTINUED] amphetamine-dextroamphetamine (ADDERALL XR) 20 MG 24 hr capsule Take 20 mg by mouth every morning   • [DISCONTINUED] azithromycin (ZITHROMAX) 500 MG tablet Take 500 mg by mouth daily   • [DISCONTINUED] hydrOXYzine HCL (ATARAX) 10 mg tablet Take by mouth   • [DISCONTINUED] amphetamine-dextroamphetamine (ADDERALL, 20MG,) 20 mg tablet  (Patient not taking: Reported on 3/8/2024)   • [DISCONTINUED] fluticasone (FLOVENT HFA) 220 mcg/act inhaler Inhale 2 puffs 2 (two) times a day (Patient not taking: Reported on 3/8/2024)   • [DISCONTINUED] metFORMIN (GLUCOPHAGE-XR) 750 mg 24 hr tablet Take 1 tablet a day for 1 week and then take 2 tablets   • [DISCONTINUED] mometasone (ELOCON) 0.1 % lotion Apply 1 application. topically daily     Allergies   Allergen Reactions   • Pollen Extract Allergic Rhinitis, Cough, Eye Swelling, Hives, Itching and Sneezing   • Cephalosporins Rash     Immunization History   Administered Date(s) Administered   • COVID-19 PFIZER VACCINE 0.3 ML IM 12/21/2020, 01/14/2021, 12/20/2021   • DTP 1995, [birth year], 10/25/1995, 06/15/1999   • DTaP 1995, 1995, 1995, 10/25/1995, 06/15/1999   • Fluzone Split Quad 0.25 mL 11/06/1998, 10/19/1999, 11/18/2002, 10/20/2003   • H1N1, All Formulations 10/27/2009   • HPV Quadrivalent 08/29/2007, 10/30/2007, 03/10/2008   • HPV9 08/29/2007, 10/30/2007, 03/10/2008   • Hep A, ped/adol, 2 dose 08/29/2007, 03/10/2008   • Hep B, Adolescent or Pediatric 1995, 1995, 1995   • Hepatitis A 08/29/2007, 03/10/2008   • HiB 1995, 1995, 04/07/1995   • INFLUENZA 11/06/1998, 10/19/1999, 11/18/2002, 10/20/2003, 10/18/2004, 10/18/2004, 10/25/2005, 10/25/2005, 11/06/2006, 10/30/2007, 10/01/2012, 10/28/2014, 12/21/2015, 12/21/2015, 09/21/2016, 09/21/2016, 10/17/2019, 10/17/2019, 10/30/2020, 10/30/2020, 11/12/2022, 10/31/2023, 10/31/2023   • IPV 1995, 1995, 10/25/1995, 06/15/1999   • MMR 06/21/1995,  "04/16/1998   • Meningococcal MCV4, Unspecified 07/29/2005, 06/11/2012   • OPV 1995   • Pneumococcal Conjugate 13-Valent 05/30/2000   • Pneumococcal Conjugate PCV 7 05/30/2000   • Pneumococcal Polysaccharide PPV23 11/18/2019   • Tdap 02/16/2010, 07/28/2022   • Tuberculin Skin Test-PPD Intradermal 01/15/1995, 09/21/2016, 08/21/2017   • Varicella 11/27/1995   • Zoster 12/15/2016       Objective     /60 (BP Location: Left arm, Patient Position: Sitting, Cuff Size: Large)   Pulse 84   Temp 97.7 °F (36.5 °C) (Tympanic)   Resp 16   Ht 5' 2\" (1.575 m)   Wt 103 kg (227 lb)   SpO2 99%   BMI 41.52 kg/m²     Physical Exam  Vitals and nursing note reviewed.   Constitutional:       Appearance: She is well-developed.   HENT:      Head: Normocephalic and atraumatic.   Eyes:      Pupils: Pupils are equal, round, and reactive to light.   Cardiovascular:      Rate and Rhythm: Normal rate and regular rhythm.      Heart sounds: Normal heart sounds.   Pulmonary:      Effort: Pulmonary effort is normal.      Breath sounds: Normal breath sounds.   Abdominal:      General: Bowel sounds are normal.      Palpations: Abdomen is soft.   Musculoskeletal:      Cervical back: Normal range of motion and neck supple.   Lymphadenopathy:      Cervical: No cervical adenopathy.   Skin:     General: Skin is warm.   Neurological:      Mental Status: She is alert and oriented to person, place, and time.       Kaity Echevarria MD    "

## 2024-03-08 NOTE — ASSESSMENT & PLAN NOTE
Her A1c was slightly elevated at 5.8.  Discussed about low-carb diet and regular exercise.  She was told Wegovy will help.

## 2024-03-08 NOTE — ASSESSMENT & PLAN NOTE
Not well-controlled.  I am going to add Adderall 20 mg short acting to be taken in the afternoon.  Continue Adderall XR 20 mg in a.m.  Will continue to monitor.  Come back in 3 to 4 weeks.

## 2024-03-08 NOTE — TELEPHONE ENCOUNTER
Received fax from [facility] Amphetamine slat ER 20mg and Amphetamine salt 20mg on back order.  L/m for pt to call office , is there another pharmacy she would like to use?

## 2024-03-08 NOTE — ASSESSMENT & PLAN NOTE
He was given sample of Ozempic 0.25 mg weekly for week 1 and 2 and then 0.5 mg weekly.  I sent prescription for Wegovy 1 mg weekly.  It was discussed about possible side effects.  Discussed about low-carb diet and regular exercise.

## 2024-03-08 NOTE — TELEPHONE ENCOUNTER
Patient called in and stated that the amphetamine-dextroamphetamine (ADDERALL XR) 20 MG 24 hr capsule and amphetamine-dextroamphetamine (ADDERALL, 20MG,) 20 mg tablet is not available at [facility]. Patient is requesting that we sent them to the [facility]  [id number] [[id number]] that I added on file.    Please call Christel and let her know when this has been rerouted that that pharmacy so she can pick it up .    Thank you

## 2024-03-09 RX ORDER — DEXTROAMPHETAMINE SACCHARATE, AMPHETAMINE ASPARTATE MONOHYDRATE, DEXTROAMPHETAMINE SULFATE AND AMPHETAMINE SULFATE 5; 5; 5; 5 MG/1; MG/1; MG/1; MG/1
20 CAPSULE, EXTENDED RELEASE ORAL EVERY MORNING
Qty: 30 CAPSULE | Refills: 0 | Status: SHIPPED | OUTPATIENT
Start: 2024-03-09

## 2024-03-09 RX ORDER — DEXTROAMPHETAMINE SACCHARATE, AMPHETAMINE ASPARTATE, DEXTROAMPHETAMINE SULFATE AND AMPHETAMINE SULFATE 5; 5; 5; 5 MG/1; MG/1; MG/1; MG/1
TABLET ORAL
Qty: 30 TABLET | Refills: 0 | Status: SHIPPED | OUTPATIENT
Start: 2024-03-09

## 2024-03-11 ENCOUNTER — TELEPHONE (OUTPATIENT)
Dept: FAMILY MEDICINE CLINIC | Facility: CLINIC | Age: 29
End: 2024-03-11

## 2024-03-11 NOTE — TELEPHONE ENCOUNTER
----- Message from Greta Zuniga sent at 3/11/2024 10:22 AM EDT -----  Regarding: FW: Family hx polycythemia vera  Contact: [phone]    ----- Message -----  From: Christel Murray  Sent: 3/9/2024   4:30 PM EDT  To: [facility]  Subject: Family hx polycythemia vera                      Hello, I've seen my test results and it looks like my WBCs and platelets are elevated as well as 6.0 A1C. I did want to tell you that my father has diabetes, polycythemia vera and also does have a history of a cerebellar ischemic stroke. Could you please add these to my Family History? Thank you!

## 2024-03-12 ENCOUNTER — OFFICE VISIT (OUTPATIENT)
Dept: FAMILY MEDICINE CLINIC | Facility: CLINIC | Age: 29
End: 2024-03-12
Payer: COMMERCIAL

## 2024-03-12 ENCOUNTER — AMB VIDEO VISIT (OUTPATIENT)
Dept: OTHER | Facility: HOSPITAL | Age: 29
End: 2024-03-12
Payer: COMMERCIAL

## 2024-03-12 VITALS
TEMPERATURE: 99.6 F | HEART RATE: 106 BPM | HEIGHT: 62 IN | WEIGHT: 226.2 LBS | OXYGEN SATURATION: 98 % | RESPIRATION RATE: 16 BRPM | SYSTOLIC BLOOD PRESSURE: 124 MMHG | BODY MASS INDEX: 41.62 KG/M2 | DIASTOLIC BLOOD PRESSURE: 80 MMHG

## 2024-03-12 DIAGNOSIS — J02.9 SORETHROAT: Primary | ICD-10-CM

## 2024-03-12 DIAGNOSIS — J45.20 MILD INTERMITTENT ASTHMA, UNSPECIFIED WHETHER COMPLICATED: ICD-10-CM

## 2024-03-12 LAB — S PYO AG THROAT QL: NEGATIVE

## 2024-03-12 PROCEDURE — 87636 SARSCOV2 & INF A&B AMP PRB: CPT | Performed by: FAMILY MEDICINE

## 2024-03-12 PROCEDURE — 99213 OFFICE O/P EST LOW 20 MIN: CPT | Performed by: FAMILY MEDICINE

## 2024-03-12 PROCEDURE — 87070 CULTURE OTHR SPECIMN AEROBIC: CPT | Performed by: FAMILY MEDICINE

## 2024-03-12 PROCEDURE — 87880 STREP A ASSAY W/OPTIC: CPT | Performed by: FAMILY MEDICINE

## 2024-03-12 PROCEDURE — 99212 OFFICE O/P EST SF 10 MIN: CPT | Performed by: FAMILY MEDICINE

## 2024-03-12 RX ORDER — PREDNISONE 50 MG/1
50 TABLET ORAL DAILY
Qty: 5 TABLET | Refills: 0 | Status: SHIPPED | OUTPATIENT
Start: 2024-03-12 | End: 2024-03-17

## 2024-03-12 RX ORDER — LIDOCAINE HYDROCHLORIDE 20 MG/ML
SOLUTION OROPHARYNGEAL
COMMUNITY
Start: 2024-03-12

## 2024-03-12 RX ORDER — IPRATROPIUM BROMIDE 42 UG/1
SPRAY, METERED NASAL
COMMUNITY
Start: 2024-03-12

## 2024-03-12 NOTE — ASSESSMENT & PLAN NOTE
Rapid strep in office was negative and sent for culture. Covid and flu swabs were also obtained with results pending. Recommended beginning prednisone 50mg once daily for 5 days for symptomatic relief in addition to cold drinks and ice.

## 2024-03-12 NOTE — CARE ANYWHERE EVISITS
Visit Summary for Christel Murray - Gender: Female - Date of Birth: 1995  Date: [id number] - Duration: 5 minutes  Patient: Christel Saunders Terri  Provider: Gisela Vann    Patient Contact Information  Address  [address] [place] [id number]  [phone]    Visit Topics  severe sore throat, nasal congestion, cough [Added By: Self - 2024-03-12]    Triage Questions   What is your current physical address in the event of a medical emergency? Answer []  Are you allergic to any medications? Answer []  Are you now or could you be pregnant? Answer []  Do you have any immune system compromise or chronic lung   disease? Answer []  Do you have any vulnerable family members in the home (infant, pregnant, cancer, elderly)? Answer []     Conversation Transcripts  [0A][0A] [Notification] You are connected with Gisela Vann, Family Physician.[0A][Notification] Christel Murray is located in [place].[0A][Notification] Christel Murray has shared health history...[0A]    Diagnosis  Acute nasopharyngitis [common cold]    Procedures  Value: [id number] Code: CPT-4 UNLISTED E&M SERVICE    Medications Prescribed    ipratropium bromide  Dose : 2 sprays  Route : nasal  Frequency : 3 times a day. Until directed to stop.   Patient Instructions : into both nostrils.   Refills : 0  Instructions to the Pharmacist : Substitutions allowed    lidocaine HCl  Dose : 15 milliliters  Route : mucous membrane  Frequency : every 3 hours. Until directed to stop.   Patient Instructions : gargle, and spit out prn for pain  Refills : 0  Instructions to the Pharmacist : Lidocaine Magic Mouthwash: 40 mL viscous lidocaine 2%  40 mL Maalox (do not substitute Kaopectate)  40 mL diphenhydramine 12.5 mg/ 5 ml elixir . Substitutions allowed      Provider Notes  [0A][0A] Mode of Communication: Video visit. Patients information verified. [0A]History: Patient is a 29 yo F presenting for nasal congestion, fatigue, sore throat, post nasal drip, nasal discharge, ear fullness,  mild cough, sinus pressure. She denies   fever, chills, shortness of breath, wheezing, diarrhea, nausea, vomiitng, loss of taste/smell, tobacco use. Symptoms started yesterday. Patient is a Psych nurse. [0A]Past medical history:Reviewed [0A]PSH: History of tonsillectomy [0A]Medications:Reviewed   [0A]Allergies:None [0A]Exam: [0A][0A]Vitals signs (if available):  [0A][0A]Weight:  [0A][0A]General: Appears comfortable, in no acute distress[0A]Eyes:Normal conjunctiva [0A]Nose:Nasal congestion [0A]Respiratory:Normal respirations, no audible wheezing.   [0A]Other: Mild pharyngeal erythema, history of tonsillectomy, no exudates seen[0A]Assessment: Acute nasopharyngitis. [0A]Diagnosis code:Plan: Symptoms likely viral, hold off on antibiotics at this time.  Antibiotics are not indicated in acute upper   respiratory infections as over 90% are viral. Taking antibiotics for viral infections will not only not help, but they could also cause longer term issues such as antibiotic resistance or unnecessary side effects. Below medications sent to pharmacy for   symptom management, medication counseling provided.Counseled extensively on worsening signs, advised to follow up if present. Patient was in agreement with plan, and verbalized understanding. 3.    Home care:        a.    Rest, adequate fluid hydration,   warm/steamy showers, saline drops, neti pot, throat lozenges, and menthol        b.    Pain relievers: Unless otherwise noted and as long as there's no reason you should not take these consider Acetaminophen, Ibuprofen with food if needed        c.      For the congestion: consider OTC multi symptom cold medication. Be aware that these can take 2-4 days to become effective.Referral or follow up: As needed for worsening or if no improvement in 5-7 days[0A]Additional recommendations: [0A]    If you   received a prescription at this visit and you have a question or problem please call [phone] for prescription assistance  [0A]    Please print a copy of this note and send it to your regular doctor, or take it to your next visit so it may be included   in your medical record [0A]    Please see your primary care provider on an annual basis or more frequently if directed [0A]The patient voiced understanding and agreement with plan.[0A]    Electronically signed by: Gisela Vann(NPI [id number])

## 2024-03-12 NOTE — PROGRESS NOTES
Name: Christel Murray      : 1995      MRN: [id number]  Encounter Provider: Kaity Echevarria MD  Encounter Date: 3/12/2024   Encounter department: [facility] [address] [facility]    Assessment & Plan     1. Sorethroat  Assessment & Plan:  Rapid strep in office was negative and sent for culture. Covid and flu swabs were also obtained with results pending. Recommended beginning prednisone 50mg once daily for 5 days for symptomatic relief in addition to cold drinks and ice.     Orders:  -     POCT rapid ANTIGEN strepA  -     predniSONE 50 mg tablet; Take 1 tablet (50 mg total) by mouth daily for 5 days  -     Covid/Flu- Office Collect Normal; Future  -     Covid/Flu- Office Collect Normal  -     Throat culture; Future  -     Throat culture    2. Mild intermittent asthma, unspecified whether complicated  Assessment & Plan:  Use inhaler PRN every 6 hours as needed for shortness of breath.              Subjective     Christel is a 27 y/o female with PMH significant for asthma, prediabetes, allergic rhinitis, anxiety, and depression presenting to the office for x1 day sore throat and headache. She denies any nausea, vomiting, or diarrhea but is experiencing congestion, fatigue, right sided earache, and dry cough in addition to the sore throat. She has tried chloraseptic, Mucinex, Nyquil, and hydroxyzine with minimal relief. The congestion was relieved by afrin and she was advised to not use it for more than three days in a row. She feels short of breath but denies any wheezing and has not needed to use her inhaler yet. Rapid strep test in office was negative and sent for culture. Covid and flu tests were also obtained with results pending.     Sore Throat   This is a new problem. The current episode started yesterday. The problem has been rapidly worsening. The pain is worse on the right side. There has been no fever. The pain is moderate. Associated symptoms include congestion, coughing, ear pain and shortness of  breath. Pertinent negatives include no abdominal pain, diarrhea, stridor or vomiting. The treatment provided mild relief.     Review of Systems   Constitutional:  Positive for activity change, appetite change and fatigue. Negative for fever.   HENT:  Positive for congestion, ear pain, postnasal drip, rhinorrhea, sinus pressure, sore throat and voice change.    Eyes:  Negative for pain and discharge.   Respiratory:  Positive for cough and shortness of breath. Negative for chest tightness, wheezing and stridor.    Gastrointestinal:  Negative for abdominal pain, constipation, diarrhea, nausea and vomiting.       History reviewed. No pertinent past medical history.  Past Surgical History:   Procedure Laterality Date   • TONSILLECTOMY     • WISDOM TOOTH EXTRACTION       Family History   Problem Relation Age of Onset   • ADD / ADHD Mother    • Spina bifida Mother    • Diabetes Father    • Stroke Father    • Polycythemia Father      Social History     Socioeconomic History   • Marital status: Single     Spouse name: None   • Number of children: None   • Years of education: None   • Highest education level: None   Occupational History   • None   Tobacco Use   • Smoking status: Never   • Smokeless tobacco: Never   Vaping Use   • Vaping status: Never Used   Substance and Sexual Activity   • Alcohol use: Never   • Drug use: Never   • Sexual activity: None   Other Topics Concern   • None   Social History Narrative   • None     Social Determinants of Health     Financial Resource Strain: Not on file   Food Insecurity: Not on file   Transportation Needs: Not on file   Physical Activity: Not on file   Stress: Not on file   Social Connections: Not on file   Intimate Partner Violence: Not on file   Housing Stability: Not on file     Current Outpatient Medications on File Prior to Visit   Medication Sig   • Albuterol Sulfate 108 (90 Base) MCG/ACT AEPB    • amphetamine-dextroamphetamine (ADDERALL XR) 20 MG 24 hr capsule Take 1 capsule  (20 mg total) by mouth every morning Max Daily Amount: 20 mg   • amphetamine-dextroamphetamine (ADDERALL, 20MG,) 20 mg tablet One tab in the afternoon   • cetirizine (ZyrTEC) 10 mg tablet Take 10 mg by mouth daily   • cholecalciferol (VITAMIN D3) 400 units tablet Take by mouth daily   • EpiPen 2-Liang 0.3 MG/0.3ML SOAJ Inject 0.3 mg into a muscle once   • hydrOXYzine HCL (ATARAX) 10 mg tablet Take 1 tablet (10 mg total) by mouth every 6 (six) hours as needed for itching   • ipratropium (ATROVENT) 0.06 % nasal spray    • Lidocaine Viscous HCl (XYLOCAINE) 2 % mucosal solution    • ondansetron (ZOFRAN) 4 mg tablet Take 1 tablet (4 mg total) by mouth every 8 (eight) hours as needed for nausea or vomiting   • PARAGARD INTRAUTERINE COPPER IU    • Semaglutide-Weight Management (WEGOVY) 1 MG/0.5ML Inject 0.5 mL (1 mg total) under the skin once a week   • [] azithromycin (ZITHROMAX) 500 MG tablet Take 1 tablet (500 mg total) by mouth daily for 3 days     Allergies   Allergen Reactions   • Pollen Extract Allergic Rhinitis, Cough, Eye Swelling, Hives, Itching and Sneezing   • Cephalosporins Rash     Immunization History   Administered Date(s) Administered   • COVID-19 PFIZER VACCINE 0.3 ML IM 2020, 2021, 2021   • DTP 1994, 1994, 1994, 10/25/1995, 06/15/1999   • DTaP 1994, 1994, 1994, 10/25/1995, 06/15/1999   • Fluzone Split Quad 0.25 mL 1998, 10/19/1999, 2002, 10/20/2003   • H1N1, All Formulations 10/27/2009   • HPV Quadrivalent 2007, 10/30/2007, 03/10/2008   • HPV9 2007, 10/30/2007, 03/10/2008   • Hep A, ped/adol, 2 dose 2007, 03/10/2008   • Hep B, Adolescent or Pediatric 1994, 1994, 1994   • Hepatitis A 2007, 03/10/2008   • HiB 1994, 1994, 1995   • INFLUENZA 1998, 10/19/1999, 2002, 10/20/2003, 10/18/2004, 10/18/2004, 10/25/2005, 10/25/2005, 2006, 10/30/2007, 10/01/2012,  "10/28/2014, 12/21/2015, 12/21/2015, 09/21/2016, 09/21/2016, 10/17/2019, 10/17/2019, 10/30/2020, 10/30/2020, 11/12/2022, 10/31/2023, 10/31/2023   • IPV 1994, 1994, 10/25/1995, 06/15/1999   • MMR 06/21/1995, 04/16/1998   • Meningococcal MCV4, Unspecified 07/29/2005, 06/11/2012   • OPV 1994   • Pneumococcal Conjugate 13-Valent 05/30/2000   • Pneumococcal Conjugate PCV 7 05/30/2000   • Pneumococcal Polysaccharide PPV23 11/18/2019   • Tdap 02/16/2010, 07/28/2022   • Tuberculin Skin Test-PPD Intradermal 01/15/1995, 09/21/2016, 08/21/2017   • Varicella 11/27/1995   • Zoster 12/15/2016       Objective     /80 (BP Location: Left arm, Patient Position: Sitting, Cuff Size: Large)   Pulse (!) 106   Temp 99.6 °F (37.6 °C) (Tympanic)   Resp 16   Ht 5' 2\" (1.575 m)   Wt 103 kg (226 lb 3.2 oz)   SpO2 98%   BMI 41.37 kg/m²     Physical Exam  Vitals and nursing note reviewed.   Constitutional:       General: She is not in acute distress.     Appearance: She is well-developed. She is ill-appearing. She is not toxic-appearing.   HENT:      Head: Normocephalic and atraumatic.      Right Ear: Tympanic membrane normal. No swelling. Tympanic membrane is not erythematous.      Left Ear: Tympanic membrane normal. No swelling. Tympanic membrane is not erythematous.      Nose: Congestion and rhinorrhea present.      Mouth/Throat:      Mouth: Mucous membranes are moist. No oral lesions.      Pharynx: Uvula midline. Posterior oropharyngeal erythema present. No oropharyngeal exudate or uvula swelling.      Tonsils: No tonsillar exudate.   Eyes:      Conjunctiva/sclera: Conjunctivae normal.   Cardiovascular:      Rate and Rhythm: Normal rate and regular rhythm.      Heart sounds: Normal heart sounds. No murmur heard.     No friction rub. No gallop.   Pulmonary:      Effort: Pulmonary effort is normal. No respiratory distress.      Breath sounds: Normal breath sounds. No stridor. No wheezing, rhonchi or rales. "   Musculoskeletal:      Cervical back: Normal range of motion and neck supple.   Lymphadenopathy:      Cervical: No cervical adenopathy.   Skin:     General: Skin is warm and dry.   Neurological:      Mental Status: She is alert.       Kaity Echevarria MD     Picato Counseling:  I discussed with the patient the risks of Picato including but not limited to erythema, scaling, itching, weeping, crusting, and pain.

## 2024-03-13 LAB
FLUAV RNA RESP QL NAA+PROBE: NEGATIVE
FLUBV RNA RESP QL NAA+PROBE: NEGATIVE
SARS-COV-2 RNA RESP QL NAA+PROBE: NEGATIVE

## 2024-03-14 LAB — BACTERIA THROAT CULT: NORMAL

## 2024-03-19 ENCOUNTER — TELEPHONE (OUTPATIENT)
Dept: FAMILY MEDICINE CLINIC | Facility: CLINIC | Age: 29
End: 2024-03-19

## 2024-03-19 NOTE — TELEPHONE ENCOUNTER
PA for Semaglutide-Weight Management (WEGOVY) 1 MG/0.5ML     Submitted via    [][employer]-KEY   [x][employer]-Case ID # [id number]   []Faxed to plan   []Other website   []Phone call Case ID #     Office notes sent, clinical questions answered. Awaiting determination    Turnaround time for your insurance to make a decision on your Prior Authorization can take 7-21 business days.

## 2024-03-20 NOTE — TELEPHONE ENCOUNTER
PA for Semaglutide-Weight Management (WEGOVY) 1 MG/0.5ML  Approved   Date(s) approved 3/18/24-3/18/25  Case #    Patient advised by [x] MyChart Message                      [x] Phone call       Pharmacy advised by [x]Fax                                     []Phone call    Approval letter scanned into [employer] Yes

## 2024-03-22 ENCOUNTER — TELEPHONE (OUTPATIENT)
Dept: FAMILY MEDICINE CLINIC | Facility: CLINIC | Age: 29
End: 2024-03-22

## 2024-03-28 LAB
APOB+LDLR+PCSK9 GENE MUT ANL BLD/T: NOT DETECTED
BRCA1+BRCA2 DEL+DUP + FULL MUT ANL BLD/T: NOT DETECTED
MLH1+MSH2+MSH6+PMS2 GN DEL+DUP+FUL M: NOT DETECTED

## 2024-04-15 ENCOUNTER — OFFICE VISIT (OUTPATIENT)
Dept: FAMILY MEDICINE CLINIC | Facility: CLINIC | Age: 29
End: 2024-04-15
Payer: COMMERCIAL

## 2024-04-15 VITALS
SYSTOLIC BLOOD PRESSURE: 118 MMHG | DIASTOLIC BLOOD PRESSURE: 80 MMHG | BODY MASS INDEX: 42.36 KG/M2 | TEMPERATURE: 99.2 F | OXYGEN SATURATION: 99 % | RESPIRATION RATE: 16 BRPM | HEIGHT: 62 IN | WEIGHT: 230.2 LBS | HEART RATE: 90 BPM

## 2024-04-15 DIAGNOSIS — J45.20 MILD INTERMITTENT ASTHMA, UNSPECIFIED WHETHER COMPLICATED: Primary | ICD-10-CM

## 2024-04-15 DIAGNOSIS — E66.01 CLASS 3 SEVERE OBESITY DUE TO EXCESS CALORIES WITHOUT SERIOUS COMORBIDITY WITH BODY MASS INDEX (BMI) OF 40.0 TO 44.9 IN ADULT (HCC): ICD-10-CM

## 2024-04-15 DIAGNOSIS — F90.0 ATTENTION DEFICIT HYPERACTIVITY DISORDER (ADHD), PREDOMINANTLY INATTENTIVE TYPE: ICD-10-CM

## 2024-04-15 DIAGNOSIS — B02.8 HERPES ZOSTER WITH OTHER COMPLICATION: ICD-10-CM

## 2024-04-15 DIAGNOSIS — J30.89 NON-SEASONAL ALLERGIC RHINITIS DUE TO OTHER ALLERGIC TRIGGER: ICD-10-CM

## 2024-04-15 DIAGNOSIS — F33.1 MDD (MAJOR DEPRESSIVE DISORDER), RECURRENT EPISODE, MODERATE (HCC): ICD-10-CM

## 2024-04-15 PROBLEM — J02.9 SORETHROAT: Status: RESOLVED | Noted: 2024-03-12 | Resolved: 2024-04-15

## 2024-04-15 PROBLEM — B02.9 SHINGLES: Status: ACTIVE | Noted: 2024-04-15

## 2024-04-15 PROCEDURE — 99214 OFFICE O/P EST MOD 30 MIN: CPT | Performed by: FAMILY MEDICINE

## 2024-04-15 RX ORDER — DEXTROAMPHETAMINE SACCHARATE, AMPHETAMINE ASPARTATE MONOHYDRATE, DEXTROAMPHETAMINE SULFATE AND AMPHETAMINE SULFATE 5; 5; 5; 5 MG/1; MG/1; MG/1; MG/1
20 CAPSULE, EXTENDED RELEASE ORAL EVERY MORNING
Qty: 30 CAPSULE | Refills: 0 | Status: SHIPPED | OUTPATIENT
Start: 2024-04-15

## 2024-04-15 RX ORDER — DEXTROAMPHETAMINE SACCHARATE, AMPHETAMINE ASPARTATE, DEXTROAMPHETAMINE SULFATE AND AMPHETAMINE SULFATE 5; 5; 5; 5 MG/1; MG/1; MG/1; MG/1
TABLET ORAL
Qty: 30 TABLET | Refills: 0 | Status: SHIPPED | OUTPATIENT
Start: 2024-04-15

## 2024-04-15 RX ORDER — GUAIFENESIN AND DEXTROMETHORPHAN HYDROBROMIDE 600; 30 MG/1; MG/1
TABLET, EXTENDED RELEASE ORAL
COMMUNITY
Start: 2024-03-23

## 2024-04-15 RX ORDER — TRIAMCINOLONE ACETONIDE 5 MG/G
CREAM TOPICAL 3 TIMES DAILY
Qty: 45 G | Refills: 1 | Status: SHIPPED | OUTPATIENT
Start: 2024-04-15

## 2024-04-15 RX ORDER — MONTELUKAST SODIUM 10 MG/1
10 TABLET ORAL
Qty: 90 TABLET | Refills: 3 | Status: SHIPPED | OUTPATIENT
Start: 2024-04-15

## 2024-04-15 RX ORDER — OLOPATADINE HYDROCHLORIDE 7 MG/ML
SOLUTION OPHTHALMIC
COMMUNITY
Start: 2024-04-13

## 2024-04-15 RX ORDER — FLUTICASONE PROPIONATE 50 MCG
SPRAY, SUSPENSION (ML) NASAL
COMMUNITY
Start: 2024-04-12

## 2024-04-15 NOTE — ASSESSMENT & PLAN NOTE
History of recurrent herpes zoster on her trunk and lower leg once a year.  Was discussed with patient it would be beneficial to get Shingrix vaccine to prevent from recurrence.  Also recommended to receive antiviral treatment to use at the beginning of the symptoms.

## 2024-04-15 NOTE — ASSESSMENT & PLAN NOTE
She was given prescription for Singulair and was told to continue her Zyrtec, Pataday, Flonase and hydroxyzine.

## 2024-04-15 NOTE — ASSESSMENT & PLAN NOTE
Discussed about low-carb diet and regular exercise.  She is to start on Wegovy 1 mg weekly.  Discussed with possible side effects.  Come back in 1 month.

## 2024-04-15 NOTE — ASSESSMENT & PLAN NOTE
Better controlled on Adderall extended release 20 mg in the morning and 20 mg short acting in the afternoon.  Continue same.  Continue to monitor.  Come back in 3 months.

## 2024-04-15 NOTE — PROGRESS NOTES
Name: Christel Murray      : 1995      MRN: [id number]  Encounter Provider: Kaity Echevarria MD  Encounter Date: 4/15/2024   Encounter department: [facility] [address] [address] [facility]    Assessment & Plan     1. Mild intermittent asthma, unspecified whether complicated  Assessment & Plan:  She was given prescription for Singulair and was told to continue albuterol as needed.    Orders:  -     montelukast (SINGULAIR) 10 mg tablet; Take 1 tablet (10 mg total) by mouth daily at bedtime    2. Non-seasonal allergic rhinitis due to other allergic trigger  Assessment & Plan:  She was given prescription for Singulair and was told to continue her Zyrtec, Pataday, Flonase and hydroxyzine.    Orders:  -     montelukast (SINGULAIR) 10 mg tablet; Take 1 tablet (10 mg total) by mouth daily at bedtime    3. Herpes zoster with other complication  Assessment & Plan:  History of recurrent herpes zoster on her trunk and lower leg once a year.  Was discussed with patient it would be beneficial to get Shingrix vaccine to prevent from recurrence.  Also recommended to receive antiviral treatment to use at the beginning of the symptoms.    Orders:  -     triamcinolone (KENALOG) 0.5 % cream; Apply topically 3 (three) times a day    4. Attention deficit hyperactivity disorder (ADHD), predominantly inattentive type  Assessment & Plan:  Better controlled on Adderall extended release 20 mg in the morning and 20 mg short acting in the afternoon.  Continue same.  Continue to monitor.  Come back in 3 months.    Orders:  -     amphetamine-dextroamphetamine (ADDERALL XR) 20 MG 24 hr capsule; Take 1 capsule (20 mg total) by mouth every morning Max Daily Amount: 20 mg  -     amphetamine-dextroamphetamine (ADDERALL, 20MG,) 20 mg tablet; One tab in the afternoon    5. MDD (major depressive disorder), recurrent episode, moderate (HCC)  Assessment & Plan:  Well-controlled without medications.  We will continue to monitor.      6. Class 3 severe  obesity due to excess calories without serious comorbidity with body mass index (BMI) of 40.0 to 44.9 in adult ([facility])  Assessment & Plan:  Discussed about low-carb diet and regular exercise.  She is to start on Wegovy 1 mg weekly.  Discussed with possible side effects.  Come back in 1 month.             Subjective     She is here today for follow-up multiple medical problems.  She has been taking her medications.  She denies any side effect.  She stated her allergies have been worse lately and she started already on Pataday, hydroxyzine and Flonase.  She is already on Zyrtec.  She stated Adderall twice a day is helping her symptoms.  She finished her Ozempic sample but she did not get her Wegovy prescription 1 mg yet.  She complains of herpes zoster rash on her back and right lower leg at least once a year since she had Zostavax vaccine in 2016.      Review of Systems   Constitutional:  Negative for chills and fever.   HENT:  Negative for trouble swallowing.    Eyes:  Negative for visual disturbance.   Respiratory:  Negative for cough and shortness of breath.    Cardiovascular:  Negative for chest pain, palpitations and leg swelling.   Gastrointestinal:  Negative for abdominal pain, constipation and diarrhea.   Endocrine: Negative for cold intolerance and heat intolerance.   Genitourinary:  Negative for difficulty urinating and dysuria.   Musculoskeletal:  Negative for gait problem.   Skin:  Positive for rash.   Neurological:  Negative for dizziness, tremors, seizures and headaches.   Hematological:  Negative for adenopathy.   Psychiatric/Behavioral:  Negative for behavioral problems.        History reviewed. No pertinent past medical history.  Past Surgical History:   Procedure Laterality Date   • TONSILLECTOMY     • WISDOM TOOTH EXTRACTION       Family History   Problem Relation Age of Onset   • ADD / ADHD Mother    • Spina bifida Mother    • Diabetes Father    • Stroke Father    • Polycythemia Father      Social  History     Socioeconomic History   • Marital status: Single     Spouse name: None   • Number of children: None   • Years of education: None   • Highest education level: None   Occupational History   • None   Tobacco Use   • Smoking status: Never   • Smokeless tobacco: Never   Vaping Use   • Vaping status: Never Used   Substance and Sexual Activity   • Alcohol use: Never   • Drug use: Never   • Sexual activity: None   Other Topics Concern   • None   Social History Narrative   • None     Social Determinants of Health     Financial Resource Strain: Not on file   Food Insecurity: Not on file   Transportation Needs: Not on file   Physical Activity: Not on file   Stress: Not on file   Social Connections: Not on file   Intimate Partner Violence: Not on file   Housing Stability: Not on file     Current Outpatient Medications on File Prior to Visit   Medication Sig   • Albuterol Sulfate 108 (90 Base) MCG/ACT AEPB    • cetirizine (ZyrTEC) 10 mg tablet Take 10 mg by mouth daily   • cholecalciferol (VITAMIN D3) 400 units tablet Take by mouth daily   • EpiPen 2-Liang 0.3 MG/0.3ML SOAJ Inject 0.3 mg into a muscle once   • fluticasone (Flonase Allergy Relief) 50 mcg/act nasal spray    • guaiFENesin-dextromethorphan (MUCINEX DM) 600-30 mg    • hydrOXYzine HCL (ATARAX) 10 mg tablet Take 1 tablet (10 mg total) by mouth every 6 (six) hours as needed for itching   • Olopatadine HCl (Pataday) 0.7 % SOLN    • ondansetron (ZOFRAN) 4 mg tablet Take 1 tablet (4 mg total) by mouth every 8 (eight) hours as needed for nausea or vomiting   • PARAGARD INTRAUTERINE COPPER IU    • Semaglutide-Weight Management (WEGOVY) 1 MG/0.5ML Inject 0.5 mL (1 mg total) under the skin once a week   • [DISCONTINUED] amphetamine-dextroamphetamine (ADDERALL XR) 20 MG 24 hr capsule Take 1 capsule (20 mg total) by mouth every morning Max Daily Amount: 20 mg   • [DISCONTINUED] amphetamine-dextroamphetamine (ADDERALL, 20MG,) 20 mg tablet One tab in the afternoon   •  "[DISCONTINUED] ipratropium (ATROVENT) 0.06 % nasal spray    • [DISCONTINUED] Lidocaine Viscous HCl (XYLOCAINE) 2 % mucosal solution      Allergies   Allergen Reactions   • Pollen Extract Allergic Rhinitis, Cough, Eye Swelling, Hives, Itching and Sneezing   • Cephalosporins Rash     Immunization History   Administered Date(s) Administered   • COVID-19 PFIZER VACCINE 0.3 ML IM 12/21/2020, 01/14/2021, 12/20/2021   • DTP 1994, 1994, 1994, 10/25/1995, 06/15/1999   • DTaP 1994, 1994, 1994, 10/25/1995, 06/15/1999   • Fluzone Split Quad 0.25 mL 11/06/1998, 10/19/1999, 11/18/2002, 10/20/2003   • H1N1, All Formulations 10/27/2009   • HPV Quadrivalent 08/29/2007, 10/30/2007, 03/10/2008   • HPV9 08/29/2007, 10/30/2007, 03/10/2008   • Hep A, ped/adol, 2 dose 08/29/2007, 03/10/2008   • Hep B, Adolescent or Pediatric 1994, 1994, 1994   • Hep B, adult 1994, 1994, 1994   • Hepatitis A 08/29/2007, 03/10/2008   • HiB 1994, 1994, 04/07/1995   • INFLUENZA 11/06/1998, 10/19/1999, 11/18/2002, 10/20/2003, 10/18/2004, 10/18/2004, 10/25/2005, 10/25/2005, 11/06/2006, 10/30/2007, 10/01/2012, 10/28/2014, 12/21/2015, 12/21/2015, 09/21/2016, 09/21/2016, 10/17/2019, 10/17/2019, 10/30/2020, 10/30/2020, 11/12/2022, 10/31/2023, 10/31/2023   • IPV 1994, 1994, 10/25/1995, 06/15/1999   • MMR 06/21/1995, 04/16/1998   • Meningococcal MCV4, Unspecified 07/29/2005, 06/11/2012   • OPV 1994   • Pneumococcal Conjugate 13-Valent 05/30/2000   • Pneumococcal Conjugate PCV 7 05/30/2000   • Pneumococcal Polysaccharide PPV23 11/18/2019   • Tdap 02/16/2010, 07/28/2022   • Tuberculin Skin Test-PPD Intradermal 01/15/1995, 09/21/2016, 08/21/2017   • Varicella 11/27/1995   • Zoster 12/15/2016       Objective     /80 (BP Location: Left arm, Patient Position: Sitting, Cuff Size: Large)   Pulse 90   Temp 99.2 °F (37.3 °C) (Tympanic)   Resp 16   Ht 5' 2\" (1.575 m)  "  Wt 104 kg (230 lb 3.2 oz)   SpO2 99%   BMI 42.10 kg/m²     Physical Exam  Vitals and nursing note reviewed.   Constitutional:       Appearance: She is well-developed.   HENT:      Head: Normocephalic and atraumatic.   Eyes:      Pupils: Pupils are equal, round, and reactive to light.   Cardiovascular:      Rate and Rhythm: Normal rate and regular rhythm.      Heart sounds: Normal heart sounds.   Pulmonary:      Effort: Pulmonary effort is normal.      Breath sounds: Normal breath sounds.   Abdominal:      General: Bowel sounds are normal.      Palpations: Abdomen is soft.   Musculoskeletal:      Cervical back: Normal range of motion and neck supple.   Lymphadenopathy:      Cervical: No cervical adenopathy.   Skin:     General: Skin is warm.      Findings: Rash present.          Neurological:      Mental Status: She is alert and oriented to person, place, and time.       Kaity Echevarria MD

## 2024-04-16 ENCOUNTER — TELEPHONE (OUTPATIENT)
Age: 29
End: 2024-04-16

## 2024-04-16 DIAGNOSIS — R11.0 NAUSEA: ICD-10-CM

## 2024-04-16 DIAGNOSIS — Z23 IMMUNIZATION DUE: Primary | ICD-10-CM

## 2024-04-16 RX ORDER — ZOSTER VACCINE RECOMBINANT, ADJUVANTED 50 MCG/0.5
0.5 KIT INTRAMUSCULAR ONCE
Qty: 1 EACH | Refills: 1 | Status: SHIPPED | OUTPATIENT
Start: 2024-04-16 | End: 2024-04-16

## 2024-04-16 RX ORDER — ONDANSETRON 4 MG/1
4 TABLET, FILM COATED ORAL EVERY 8 HOURS PRN
Qty: 20 TABLET | Refills: 0 | Status: SHIPPED | OUTPATIENT
Start: 2024-04-16

## 2024-04-16 NOTE — TELEPHONE ENCOUNTER
Patient called stating at her appt yesterday with Dr Echevarria she was advised to get the shingles vaccine, pharmacy is requesting to have a script sent over for this . Please advise and send to [facility] .

## 2024-04-30 DIAGNOSIS — B02.8 HERPES ZOSTER WITH OTHER COMPLICATION: ICD-10-CM

## 2024-05-01 PROBLEM — R05.1 ACUTE COUGH: Status: RESOLVED | Noted: 2024-03-08 | Resolved: 2024-05-01

## 2024-05-01 RX ORDER — TRIAMCINOLONE ACETONIDE 5 MG/G
CREAM TOPICAL 3 TIMES DAILY
Qty: 45 G | Refills: 1 | Status: SHIPPED | OUTPATIENT
Start: 2024-05-01

## 2024-05-08 ENCOUNTER — OFFICE VISIT (OUTPATIENT)
Dept: FAMILY MEDICINE CLINIC | Facility: CLINIC | Age: 29
End: 2024-05-08
Payer: COMMERCIAL

## 2024-05-08 VITALS
HEIGHT: 62 IN | WEIGHT: 222 LBS | BODY MASS INDEX: 40.85 KG/M2 | OXYGEN SATURATION: 98 % | SYSTOLIC BLOOD PRESSURE: 118 MMHG | TEMPERATURE: 98.8 F | RESPIRATION RATE: 16 BRPM | HEART RATE: 102 BPM | DIASTOLIC BLOOD PRESSURE: 78 MMHG

## 2024-05-08 DIAGNOSIS — F33.1 MDD (MAJOR DEPRESSIVE DISORDER), RECURRENT EPISODE, MODERATE (HCC): ICD-10-CM

## 2024-05-08 DIAGNOSIS — Z97.5 IUD (INTRAUTERINE DEVICE) IN PLACE: ICD-10-CM

## 2024-05-08 DIAGNOSIS — J30.89 ALLERGIC RHINITIS DUE TO OTHER ALLERGIC TRIGGER, UNSPECIFIED SEASONALITY: ICD-10-CM

## 2024-05-08 DIAGNOSIS — R73.9 HYPERGLYCEMIA: ICD-10-CM

## 2024-05-08 DIAGNOSIS — E66.01 CLASS 3 SEVERE OBESITY DUE TO EXCESS CALORIES WITHOUT SERIOUS COMORBIDITY WITH BODY MASS INDEX (BMI) OF 40.0 TO 44.9 IN ADULT (HCC): Primary | ICD-10-CM

## 2024-05-08 DIAGNOSIS — Z12.4 CERVICAL CANCER SCREENING: ICD-10-CM

## 2024-05-08 DIAGNOSIS — F90.0 ATTENTION DEFICIT HYPERACTIVITY DISORDER (ADHD), PREDOMINANTLY INATTENTIVE TYPE: ICD-10-CM

## 2024-05-08 DIAGNOSIS — J45.20 MILD INTERMITTENT ASTHMA, UNSPECIFIED WHETHER COMPLICATED: ICD-10-CM

## 2024-05-08 PROCEDURE — 99214 OFFICE O/P EST MOD 30 MIN: CPT | Performed by: FAMILY MEDICINE

## 2024-05-08 NOTE — PROGRESS NOTES
Name: Christel Murray      : 1995      MRN: [id number]  Encounter Provider: Kaity Echevarria MD  Encounter Date: 2024   Encounter department:  [facility] [address] [facility]    Assessment & Plan     1. Class 3 severe obesity due to excess calories without serious comorbidity with body mass index (BMI) of 40.0 to 44.9 in adult ([facility])  Assessment & Plan:  Patient has lost 8lbs since visit last month.  Currently managed on Wegovy.  Wegovy dose increased from 1mg to 1.7mg today.  Start Wegovy 1.7mg SQ weekly.     Orders:  -     Semaglutide-Weight Management (WEGOVY) 1.7 MG/0.75ML; Inject 0.75 mL (1.7 mg total) under the skin once a week    2. IUD (intrauterine device) in place  Assessment & Plan:  Patient has thoughts about getting IUD out.   Has had copper IUD since 2018.  Inquiring about other forms of non-hormonal birth control.    Orders:  -     Ambulatory Referral to Obstetrics / Gynecology; Future    3. Cervical cancer screening  -     Ambulatory Referral to Obstetrics / Gynecology; Future    4. Allergic rhinitis due to other allergic trigger, unspecified seasonality  Assessment & Plan:  Continue medications as prescribed.      5. Mild intermittent asthma, unspecified whether complicated  Assessment & Plan:  Stable and asymptomatic, no signs of exacerbation at this time.   Continue Singulair 10mg PO daily.       6. Attention deficit hyperactivity disorder (ADHD), predominantly inattentive type  Assessment & Plan:  Stable on current regimen.   Continue Adderall XR 20mg and Adderall 20mg daily.      7. MDD (major depressive disorder), recurrent episode, moderate ([facility])  Assessment & Plan:  Stable without medications at this time.       8. Hyperglycemia  -     Hemoglobin A1C; Future           Subjective     Christel Murray is a 30yo with PMH of allergic rhinitis, ADHD, obesity, asthma, depression presents to the office today for weight management follow-up. Tolerating the Wegovy well, has not needed Zofran  many times. Has lost 8lbs since her visit less than a month ago.   Notes bad seasonal allergies but manageable.   Has thoughts about getting her IUD out. Has had copper IUD in place since 2018. Does not follow with OBGYN currently.      Review of Systems   Constitutional:  Negative for chills and fever.   HENT:  Negative for ear pain and sore throat.    Eyes:  Negative for pain and visual disturbance.   Respiratory:  Negative for cough and shortness of breath.    Cardiovascular:  Negative for chest pain and palpitations.   Gastrointestinal:  Negative for abdominal pain and vomiting.   Genitourinary:  Negative for dysuria and hematuria.   Musculoskeletal:  Negative for arthralgias and back pain.   Skin:  Negative for color change and rash.   Neurological:  Negative for seizures and syncope.   All other systems reviewed and are negative.      History reviewed. No pertinent past medical history.  Past Surgical History:   Procedure Laterality Date   • TONSILLECTOMY     • WISDOM TOOTH EXTRACTION       Family History   Problem Relation Age of Onset   • ADD / ADHD Mother    • Spina bifida Mother    • Diabetes Father    • Stroke Father    • Polycythemia Father      Social History     Socioeconomic History   • Marital status: Single     Spouse name: None   • Number of children: None   • Years of education: None   • Highest education level: None   Occupational History   • None   Tobacco Use   • Smoking status: Never   • Smokeless tobacco: Never   Vaping Use   • Vaping status: Never Used   Substance and Sexual Activity   • Alcohol use: Never   • Drug use: Never   • Sexual activity: None   Other Topics Concern   • None   Social History Narrative   • None     Social Determinants of Health     Financial Resource Strain: Not on file   Food Insecurity: Not on file   Transportation Needs: Not on file   Physical Activity: Not on file   Stress: Not on file   Social Connections: Not on file   Intimate Partner Violence: Not on file    Housing Stability: Not on file     Current Outpatient Medications on File Prior to Visit   Medication Sig   • Albuterol Sulfate 108 (90 Base) MCG/ACT AEPB    • amphetamine-dextroamphetamine (ADDERALL XR) 20 MG 24 hr capsule Take 1 capsule (20 mg total) by mouth every morning Max Daily Amount: 20 mg   • amphetamine-dextroamphetamine (ADDERALL, 20MG,) 20 mg tablet One tab in the afternoon   • cetirizine (ZyrTEC) 10 mg tablet Take 10 mg by mouth daily   • cholecalciferol (VITAMIN D3) 400 units tablet Take by mouth daily   • EpiPen 2-Liang 0.3 MG/0.3ML SOAJ Inject 0.3 mg into a muscle once   • fluticasone (Flonase Allergy Relief) 50 mcg/act nasal spray    • guaiFENesin-dextromethorphan (MUCINEX DM) 600-30 mg    • hydrOXYzine HCL (ATARAX) 10 mg tablet Take 1 tablet (10 mg total) by mouth every 6 (six) hours as needed for itching   • montelukast (SINGULAIR) 10 mg tablet Take 1 tablet (10 mg total) by mouth daily at bedtime   • Olopatadine HCl (Pataday) 0.7 % SOLN    • ondansetron (ZOFRAN) 4 mg tablet Take 1 tablet (4 mg total) by mouth every 8 (eight) hours as needed for nausea or vomiting   • PARAGARD INTRAUTERINE COPPER IU    • [DISCONTINUED] Semaglutide-Weight Management (WEGOVY) 1 MG/0.5ML Inject 0.5 mL (1 mg total) under the skin once a week   • triamcinolone (KENALOG) 0.5 % cream APPLY TOPICALLY 3 (THREE) TIMES A DAY (Patient not taking: Reported on 5/8/2024)     Allergies   Allergen Reactions   • Pollen Extract Allergic Rhinitis, Cough, Eye Swelling, Hives, Itching and Sneezing   • Cephalosporins Rash     Immunization History   Administered Date(s) Administered   • COVID-19 PFIZER VACCINE 0.3 ML IM 12/21/2020, 01/14/2021, 12/20/2021   • DTP 1994, 1994, 1994, 10/25/1995, 06/15/1999   • DTaP 1994, 1994, 1994, 10/25/1995, 06/15/1999   • Fluzone Split Quad 0.25 mL 11/06/1998, 10/19/1999, 11/18/2002, 10/20/2003   • H1N1, All Formulations 10/27/2009   • HPV Quadrivalent 08/29/2007,  "10/30/2007, 03/10/2008   • HPV9 08/29/2007, 10/30/2007, 03/10/2008   • Hep A, ped/adol, 2 dose 08/29/2007, 03/10/2008   • Hep B, Adolescent or Pediatric 1994, 1994, 1994   • Hep B, adult 1994, 1994, 1994   • Hepatitis A 08/29/2007, 03/10/2008   • HiB 1994, 1994, 04/07/1995   • INFLUENZA 11/06/1998, 10/19/1999, 11/18/2002, 10/20/2003, 10/18/2004, 10/18/2004, 10/25/2005, 10/25/2005, 11/06/2006, 10/30/2007, 10/01/2012, 10/28/2014, 12/21/2015, 12/21/2015, 09/21/2016, 09/21/2016, 10/17/2019, 10/17/2019, 10/30/2020, 10/30/2020, 11/12/2022, 10/31/2023, 10/31/2023   • IPV 1994, 1994, 10/25/1995, 06/15/1999   • MMR 06/21/1995, 04/16/1998   • Meningococcal MCV4, Unspecified 07/29/2005, 06/11/2012   • OPV 1994   • Pneumococcal Conjugate 13-Valent 05/30/2000   • Pneumococcal Conjugate PCV 7 05/30/2000   • Pneumococcal Polysaccharide PPV23 11/18/2019   • Tdap 02/16/2010, 07/28/2022   • Tuberculin Skin Test-PPD Intradermal 01/15/1995, 09/21/2016, 08/21/2017   • Varicella 11/27/1995   • Zoster 12/15/2016       Objective     /78 (BP Location: Left arm, Patient Position: Sitting, Cuff Size: Standard)   Pulse 102   Temp 98.8 °F (37.1 °C) (Tympanic)   Resp 16   Ht 5' 2\" (1.575 m)   Wt 101 kg (222 lb)   SpO2 98%   BMI 40.60 kg/m²     Physical Exam  Vitals reviewed.   Constitutional:       Appearance: Normal appearance. She is obese.   HENT:      Right Ear: Tympanic membrane normal.      Left Ear: Tympanic membrane normal.      Mouth/Throat:      Mouth: Mucous membranes are moist.   Eyes:      Pupils: Pupils are equal, round, and reactive to light.   Cardiovascular:      Rate and Rhythm: Normal rate and regular rhythm.      Pulses: Normal pulses.      Heart sounds: Normal heart sounds.   Pulmonary:      Effort: Pulmonary effort is normal.      Breath sounds: Normal breath sounds.   Skin:     General: Skin is warm.      Capillary Refill: Capillary refill takes " less than 2 seconds.   Neurological:      Mental Status: She is alert and oriented to person, place, and time.       Kaity Echevarria MD

## 2024-05-08 NOTE — ASSESSMENT & PLAN NOTE
Patient has thoughts about getting IUD out.   Has had copper IUD since 2018.  Inquiring about other forms of non-hormonal birth control.

## 2024-05-08 NOTE — ASSESSMENT & PLAN NOTE
Stable and asymptomatic, no signs of exacerbation at this time.   Continue Singulair 10mg PO daily.

## 2024-05-08 NOTE — ASSESSMENT & PLAN NOTE
Patient has lost 8lbs since visit last month.  Currently managed on Wegovy.  Wegovy dose increased from 1mg to 1.7mg today.  Start Wegovy 1.7mg SQ weekly.

## 2024-06-05 ENCOUNTER — OFFICE VISIT (OUTPATIENT)
Dept: FAMILY MEDICINE CLINIC | Facility: CLINIC | Age: 29
End: 2024-06-05
Payer: COMMERCIAL

## 2024-06-05 VITALS
HEIGHT: 62 IN | HEART RATE: 79 BPM | TEMPERATURE: 98.7 F | DIASTOLIC BLOOD PRESSURE: 68 MMHG | BODY MASS INDEX: 40.3 KG/M2 | SYSTOLIC BLOOD PRESSURE: 112 MMHG | RESPIRATION RATE: 16 BRPM | WEIGHT: 219 LBS | OXYGEN SATURATION: 99 %

## 2024-06-05 DIAGNOSIS — R73.9 HYPERGLYCEMIA: ICD-10-CM

## 2024-06-05 DIAGNOSIS — G43.809 OTHER MIGRAINE WITHOUT STATUS MIGRAINOSUS, NOT INTRACTABLE: ICD-10-CM

## 2024-06-05 DIAGNOSIS — K21.9 GASTROESOPHAGEAL REFLUX DISEASE WITHOUT ESOPHAGITIS: ICD-10-CM

## 2024-06-05 DIAGNOSIS — R11.0 NAUSEA: ICD-10-CM

## 2024-06-05 DIAGNOSIS — F90.0 ATTENTION DEFICIT HYPERACTIVITY DISORDER (ADHD), PREDOMINANTLY INATTENTIVE TYPE: ICD-10-CM

## 2024-06-05 DIAGNOSIS — F33.1 MDD (MAJOR DEPRESSIVE DISORDER), RECURRENT EPISODE, MODERATE (HCC): ICD-10-CM

## 2024-06-05 DIAGNOSIS — J30.89 ALLERGIC RHINITIS DUE TO OTHER ALLERGIC TRIGGER, UNSPECIFIED SEASONALITY: ICD-10-CM

## 2024-06-05 DIAGNOSIS — E66.01 CLASS 3 SEVERE OBESITY DUE TO EXCESS CALORIES WITHOUT SERIOUS COMORBIDITY WITH BODY MASS INDEX (BMI) OF 40.0 TO 44.9 IN ADULT (HCC): Primary | ICD-10-CM

## 2024-06-05 PROBLEM — G43.909 MIGRAINE WITHOUT STATUS MIGRAINOSUS, NOT INTRACTABLE: Status: ACTIVE | Noted: 2024-06-05

## 2024-06-05 PROCEDURE — 99214 OFFICE O/P EST MOD 30 MIN: CPT | Performed by: FAMILY MEDICINE

## 2024-06-05 RX ORDER — SEMAGLUTIDE 2.4 MG/.75ML
INJECTION, SOLUTION SUBCUTANEOUS
Qty: 3 ML | Refills: 3 | Status: SHIPPED | OUTPATIENT
Start: 2024-06-05

## 2024-06-05 RX ORDER — DEXTROAMPHETAMINE SACCHARATE, AMPHETAMINE ASPARTATE, DEXTROAMPHETAMINE SULFATE AND AMPHETAMINE SULFATE 5; 5; 5; 5 MG/1; MG/1; MG/1; MG/1
TABLET ORAL
Qty: 30 TABLET | Refills: 0 | Status: SHIPPED | OUTPATIENT
Start: 2024-06-05

## 2024-06-05 RX ORDER — PANTOPRAZOLE SODIUM 40 MG/1
40 TABLET, DELAYED RELEASE ORAL
Qty: 30 TABLET | Refills: 1 | Status: SHIPPED | OUTPATIENT
Start: 2024-06-05

## 2024-06-05 RX ORDER — DEXTROAMPHETAMINE SACCHARATE, AMPHETAMINE ASPARTATE MONOHYDRATE, DEXTROAMPHETAMINE SULFATE AND AMPHETAMINE SULFATE 5; 5; 5; 5 MG/1; MG/1; MG/1; MG/1
20 CAPSULE, EXTENDED RELEASE ORAL EVERY MORNING
Qty: 30 CAPSULE | Refills: 0 | Status: SHIPPED | OUTPATIENT
Start: 2024-06-05

## 2024-06-05 RX ORDER — ONDANSETRON 4 MG/1
4 TABLET, FILM COATED ORAL EVERY 8 HOURS PRN
Qty: 20 TABLET | Refills: 0 | Status: SHIPPED | OUTPATIENT
Start: 2024-06-05

## 2024-06-05 NOTE — PROGRESS NOTES
Ambulatory Visit  Name: Christel Murray      : 1995      MRN: [id number]  Encounter Provider: Kaity Echevarria MD  Encounter Date: 2024   Encounter department: [facility] [address] [facility]    Assessment & Plan   1. Class 3 severe obesity due to excess calories without serious comorbidity with body mass index (BMI) of 40.0 to 44.9 in adult ([facility])  Assessment & Plan:  Currently managed on Wegovy. Dose increased 1.7mg to 2.4mg. Follow-up in 1 month.  Orders:  -     Semaglutide-Weight Management (Wegovy) 2.4 MG/0.75ML; Inject 2.4 mg under the skin weekly  2. Hyperglycemia  Assessment & Plan:  Continue to monitor with fasting glucose and A1C.  3. Attention deficit hyperactivity disorder (ADHD), predominantly inattentive type  Assessment & Plan:  Stable. Continue Adderall XR 20mg and Adderall 20mg daily.   Orders:  -     amphetamine-dextroamphetamine (ADDERALL XR) 20 MG 24 hr capsule; Take 1 capsule (20 mg total) by mouth every morning Max Daily Amount: 20 mg  -     amphetamine-dextroamphetamine (ADDERALL, 20MG,) 20 mg tablet; One tab in the afternoon  4. MDD (major depressive disorder), recurrent episode, moderate ([facility])  Assessment & Plan:  Stable without medications.  5. Allergic rhinitis due to other allergic trigger, unspecified seasonality  Assessment & Plan:  Continue medication regimen. Continue to monitor symptoms.  6. Nausea  -     ondansetron (ZOFRAN) 4 mg tablet; Take 1 tablet (4 mg total) by mouth every 8 (eight) hours as needed for nausea or vomiting  7. Gastroesophageal reflux disease without esophagitis  Assessment & Plan:  She was given prescription for pantoprazole.  Discussed about possible side effects.  Orders:  -     pantoprazole (PROTONIX) 40 mg tablet; Take 1 tablet (40 mg total) by mouth daily before breakfast  8. Other migraine without status migrainosus, not intractable  Assessment & Plan:  Samples of migraine medications given today. Advised of correct use and possible side  effects.         History of Present Illness     28 y/o female with PMH of obesity, hyperglycemia, ADHD presenting to the office for 1-month follow-up visit. She is down 3 pounds since last visit although she states it is a week prior to her menstrual cycle and she feels she has a lot of water retention around this time. She states she is doing well on the Wegovy. She is up to 1.7mg and states she only needs zofran a few times especially after her injection. She states she does not have other side effects from the medication. She does need a refill of her zofran. She reports she is doing well on here Adderall for her ADHD. She had bloodwork last in March. Patient is also reporting concerns of headaches that she gets.     Headache    Review of Systems   Constitutional:  Negative for chills and fever.   HENT:  Negative for ear pain and sore throat.    Eyes:  Negative for pain and visual disturbance.   Respiratory:  Negative for cough and shortness of breath.    Cardiovascular:  Negative for chest pain and palpitations.   Gastrointestinal:  Positive for nausea. Negative for abdominal pain and vomiting.   Genitourinary:  Negative for dysuria and hematuria.   Musculoskeletal:  Negative for arthralgias and back pain.   Skin:  Negative for color change and rash.   Neurological:  Positive for headaches. Negative for seizures and syncope.   All other systems reviewed and are negative.    History reviewed. No pertinent past medical history.  Past Surgical History:   Procedure Laterality Date   • TONSILLECTOMY     • WISDOM TOOTH EXTRACTION       Family History   Problem Relation Age of Onset   • ADD / ADHD Mother    • Spina bifida Mother    • Diabetes Father    • Stroke Father    • Polycythemia Father      Social History     Tobacco Use   • Smoking status: Never   • Smokeless tobacco: Never   Vaping Use   • Vaping status: Never Used   Substance and Sexual Activity   • Alcohol use: Never   • Drug use: Never   • Sexual activity:  Not on file     Current Outpatient Medications on File Prior to Visit   Medication Sig   • Albuterol Sulfate 108 (90 Base) MCG/ACT AEPB    • cetirizine (ZyrTEC) 10 mg tablet Take 10 mg by mouth daily   • cholecalciferol (VITAMIN D3) 400 units tablet Take by mouth daily   • EpiPen 2-Liang 0.3 MG/0.3ML SOAJ Inject 0.3 mg into a muscle once   • fluticasone (Flonase Allergy Relief) 50 mcg/act nasal spray    • guaiFENesin-dextromethorphan (MUCINEX DM) 600-30 mg    • hydrOXYzine HCL (ATARAX) 10 mg tablet Take 1 tablet (10 mg total) by mouth every 6 (six) hours as needed for itching   • montelukast (SINGULAIR) 10 mg tablet Take 1 tablet (10 mg total) by mouth daily at bedtime   • Olopatadine HCl (Pataday) 0.7 % SOLN    • PARAGARD INTRAUTERINE COPPER IU    • [DISCONTINUED] amphetamine-dextroamphetamine (ADDERALL XR) 20 MG 24 hr capsule Take 1 capsule (20 mg total) by mouth every morning Max Daily Amount: 20 mg   • [DISCONTINUED] amphetamine-dextroamphetamine (ADDERALL, 20MG,) 20 mg tablet One tab in the afternoon   • [DISCONTINUED] ondansetron (ZOFRAN) 4 mg tablet Take 1 tablet (4 mg total) by mouth every 8 (eight) hours as needed for nausea or vomiting   • [DISCONTINUED] Semaglutide-Weight Management (WEGOVY) 1.7 MG/0.75ML Inject 0.75 mL (1.7 mg total) under the skin once a week   • triamcinolone (KENALOG) 0.5 % cream APPLY TOPICALLY 3 (THREE) TIMES A DAY (Patient not taking: Reported on 5/8/2024)     Allergies   Allergen Reactions   • Pollen Extract Allergic Rhinitis, Cough, Eye Swelling, Hives, Itching and Sneezing   • Cephalosporins Rash     Immunization History   Administered Date(s) Administered   • COVID-19 PFIZER VACCINE 0.3 ML IM 12/21/2020, 01/14/2021, 12/20/2021   • DTP 1994, 1994, 1994, 10/25/1995, 06/15/1999   • DTaP 1994, 1994, 1994, 10/25/1995, 06/15/1999   • Fluzone Split Quad 0.25 mL 11/06/1998, 10/19/1999, 11/18/2002, 10/20/2003   • H1N1, All Formulations 10/27/2009   •  "HPV Quadrivalent 08/29/2007, 10/30/2007, 03/10/2008   • HPV9 08/29/2007, 10/30/2007, 03/10/2008   • Hep A, ped/adol, 2 dose 08/29/2007, 03/10/2008   • Hep B, Adolescent or Pediatric 1994, 1994, 1994   • Hep B, adult 1994, 1994, 1994   • Hepatitis A 08/29/2007, 03/10/2008   • HiB 1994, 1994, 04/07/1995   • INFLUENZA 11/06/1998, 10/19/1999, 11/18/2002, 10/20/2003, 10/18/2004, 10/18/2004, 10/25/2005, 10/25/2005, 11/06/2006, 10/30/2007, 10/01/2012, 10/28/2014, 12/21/2015, 12/21/2015, 09/21/2016, 09/21/2016, 10/17/2019, 10/17/2019, 10/30/2020, 10/30/2020, 11/12/2022, 10/31/2023, 10/31/2023   • IPV 1994, 1994, 10/25/1995, 06/15/1999   • MMR 06/21/1995, 04/16/1998   • Meningococcal MCV4, Unspecified 07/29/2005, 06/11/2012   • OPV 1994   • Pneumococcal Conjugate 13-Valent 05/30/2000   • Pneumococcal Conjugate PCV 7 05/30/2000   • Pneumococcal Polysaccharide PPV23 11/18/2019   • Tdap 02/16/2010, 07/28/2022   • Tuberculin Skin Test-PPD Intradermal 01/15/1995, 09/21/2016, 08/21/2017   • Varicella 11/27/1995   • Zoster 12/15/2016     Objective     /68 (BP Location: Left arm, Patient Position: Sitting, Cuff Size: Large)   Pulse 79   Temp 98.7 °F (37.1 °C) (Tympanic)   Resp 16   Ht 5' 2\" (1.575 m)   Wt 99.3 kg (219 lb)   SpO2 99%   BMI 40.06 kg/m²     Physical Exam  Vitals and nursing note reviewed.   Constitutional:       General: She is not in acute distress.     Appearance: Normal appearance. She is well-developed.   HENT:      Head: Normocephalic and atraumatic.   Eyes:      Conjunctiva/sclera: Conjunctivae normal.   Cardiovascular:      Rate and Rhythm: Normal rate and regular rhythm.      Heart sounds: No murmur heard.  Pulmonary:      Effort: Pulmonary effort is normal. No respiratory distress.      Breath sounds: Normal breath sounds.   Abdominal:      Palpations: Abdomen is soft.      Tenderness: There is no abdominal tenderness. "   Musculoskeletal:         General: No swelling.      Cervical back: Neck supple.   Skin:     General: Skin is warm and dry.      Capillary Refill: Capillary refill takes less than 2 seconds.   Neurological:      Mental Status: She is alert.   Psychiatric:         Mood and Affect: Mood normal.       Administrative Statements

## 2024-07-05 ENCOUNTER — APPOINTMENT (OUTPATIENT)
Dept: LAB | Facility: HOSPITAL | Age: 29
End: 2024-07-05

## 2024-07-05 DIAGNOSIS — Z00.8 HEALTH EXAMINATION IN POPULATION SURVEY: ICD-10-CM

## 2024-07-05 LAB
CHOLEST SERPL-MCNC: 137 MG/DL
EST. AVERAGE GLUCOSE BLD GHB EST-MCNC: 108 MG/DL
HBA1C MFR BLD: 5.4 %
HDLC SERPL-MCNC: 59 MG/DL
LDLC SERPL CALC-MCNC: 63 MG/DL (ref 0–100)
NONHDLC SERPL-MCNC: 78 MG/DL
TRIGL SERPL-MCNC: 74 MG/DL

## 2024-07-05 PROCEDURE — 36415 COLL VENOUS BLD VENIPUNCTURE: CPT

## 2024-07-05 PROCEDURE — 80061 LIPID PANEL: CPT

## 2024-07-05 PROCEDURE — 83036 HEMOGLOBIN GLYCOSYLATED A1C: CPT

## 2024-07-16 ENCOUNTER — OFFICE VISIT (OUTPATIENT)
Dept: OBGYN CLINIC | Facility: CLINIC | Age: 29
End: 2024-07-16
Payer: COMMERCIAL

## 2024-07-16 ENCOUNTER — OFFICE VISIT (OUTPATIENT)
Dept: FAMILY MEDICINE CLINIC | Facility: CLINIC | Age: 29
End: 2024-07-16
Payer: COMMERCIAL

## 2024-07-16 VITALS
HEIGHT: 62 IN | HEART RATE: 77 BPM | TEMPERATURE: 98.4 F | WEIGHT: 204 LBS | RESPIRATION RATE: 16 BRPM | OXYGEN SATURATION: 99 % | BODY MASS INDEX: 37.54 KG/M2 | DIASTOLIC BLOOD PRESSURE: 70 MMHG | SYSTOLIC BLOOD PRESSURE: 110 MMHG

## 2024-07-16 VITALS
WEIGHT: 204 LBS | BODY MASS INDEX: 36.14 KG/M2 | HEIGHT: 63 IN | SYSTOLIC BLOOD PRESSURE: 122 MMHG | DIASTOLIC BLOOD PRESSURE: 76 MMHG

## 2024-07-16 DIAGNOSIS — E66.09 CLASS 2 OBESITY DUE TO EXCESS CALORIES WITHOUT SERIOUS COMORBIDITY WITH BODY MASS INDEX (BMI) OF 36.0 TO 36.9 IN ADULT: ICD-10-CM

## 2024-07-16 DIAGNOSIS — Z12.4 CERVICAL CANCER SCREENING: ICD-10-CM

## 2024-07-16 DIAGNOSIS — R23.8 SCALP IRRITATION: ICD-10-CM

## 2024-07-16 DIAGNOSIS — F90.0 ATTENTION DEFICIT HYPERACTIVITY DISORDER (ADHD), PREDOMINANTLY INATTENTIVE TYPE: Primary | ICD-10-CM

## 2024-07-16 DIAGNOSIS — Z97.5 IUD (INTRAUTERINE DEVICE) IN PLACE: ICD-10-CM

## 2024-07-16 PROBLEM — E66.813 CLASS 3 SEVERE OBESITY DUE TO EXCESS CALORIES WITHOUT SERIOUS COMORBIDITY WITH BODY MASS INDEX (BMI) OF 40.0 TO 44.9 IN ADULT (HCC): Status: ACTIVE | Noted: 2023-11-10

## 2024-07-16 PROBLEM — E66.812 CLASS 2 OBESITY DUE TO EXCESS CALORIES WITHOUT SERIOUS COMORBIDITY WITH BODY MASS INDEX (BMI) OF 36.0 TO 36.9 IN ADULT: Status: ACTIVE | Noted: 2023-11-10

## 2024-07-16 PROBLEM — E66.01 CLASS 3 SEVERE OBESITY DUE TO EXCESS CALORIES WITHOUT SERIOUS COMORBIDITY WITH BODY MASS INDEX (BMI) OF 40.0 TO 44.9 IN ADULT (HCC): Status: ACTIVE | Noted: 2023-11-10

## 2024-07-16 PROCEDURE — G0476 HPV COMBO ASSAY CA SCREEN: HCPCS | Performed by: OBSTETRICS & GYNECOLOGY

## 2024-07-16 PROCEDURE — 99213 OFFICE O/P EST LOW 20 MIN: CPT | Performed by: FAMILY MEDICINE

## 2024-07-16 PROCEDURE — G0145 SCR C/V CYTO,THINLAYER,RESCR: HCPCS | Performed by: OBSTETRICS & GYNECOLOGY

## 2024-07-16 PROCEDURE — S0610 ANNUAL GYNECOLOGICAL EXAMINA: HCPCS | Performed by: OBSTETRICS & GYNECOLOGY

## 2024-07-16 RX ORDER — KETOCONAZOLE 20 MG/ML
1 SHAMPOO TOPICAL 2 TIMES WEEKLY
Qty: 120 ML | Refills: 0 | Status: SHIPPED | OUTPATIENT
Start: 2024-07-18

## 2024-07-16 RX ORDER — DEXTROAMPHETAMINE SACCHARATE, AMPHETAMINE ASPARTATE MONOHYDRATE, DEXTROAMPHETAMINE SULFATE AND AMPHETAMINE SULFATE 5; 5; 5; 5 MG/1; MG/1; MG/1; MG/1
20 CAPSULE, EXTENDED RELEASE ORAL EVERY MORNING
Qty: 30 CAPSULE | Refills: 0 | Status: SHIPPED | OUTPATIENT
Start: 2024-07-16

## 2024-07-16 RX ORDER — DEXTROAMPHETAMINE SACCHARATE, AMPHETAMINE ASPARTATE, DEXTROAMPHETAMINE SULFATE AND AMPHETAMINE SULFATE 5; 5; 5; 5 MG/1; MG/1; MG/1; MG/1
TABLET ORAL
Qty: 30 TABLET | Refills: 0 | Status: SHIPPED | OUTPATIENT
Start: 2024-07-16

## 2024-07-16 NOTE — PROGRESS NOTES
"Alysa Murray is a 29 y.o. female who presents for annual well woman exam.     GYN:   No vaginal discharge, labial erythema or lesions, dyspareunia.   Menarche at 10.   LMP: 24   Menses are regular, q 28-30 days, lasting 6 days.   Contraception: Paraguard IUD (2019)   Patient is sexually active with male partner.   HPV vaccination status: fully vaccinated   Gynecologic surgeries: none    OB:    female    :   No dysuria, urinary frequency or urgency.   No hematuria, flank pain, incontinence.    Breast:   No breast mass, skin changes, dimpling, reddening, nipple retraction.   No breast discharge.   Patient does not have a family history of breast, endometrial, or ovarian ca.     General:    She does feel safe in her home.  Calcium intake encompasses milk (cow, goat, almond, cashew, soy, etc), cheese, yogurt, and greens (myrtle, turnip, kale, etc) for a total of 2-3 servings daily on average.  She does take additional Vitamin D (MVI or supplement).   Exercises 1 times per week.   Work: nurse   ETOH use: none   Tobacco use: none   Recreational drug use: none    Screening:   Cervical cancer: last pap smear in . Results were NILM.   Breast cancer: N/A   Colon cancer: N/A   STD screening: screening done in .    Review of Systems  Pertinent items are noted in HPI.      Objective      /76 (BP Location: Right arm, Patient Position: Sitting, Cuff Size: Large)   Ht 5' 3\" (1.6 m)   Wt 92.5 kg (204 lb)   LMP 2024 (Exact Date)   BMI 36.14 kg/m²     Physical Exam  Constitutional:       Appearance: She is well-developed.   Genitourinary:      Right Labia: No rash or tenderness.     Left Labia: No tenderness or rash.     No vaginal discharge or bleeding.        Right Adnexa: not tender and no mass present.     Left Adnexa: not tender and no mass present.     No cervical motion tenderness, friability or lesion.      Uterus is not tender.   Breasts:     Right: Normal.      Left: Normal. "   Cardiovascular:      Rate and Rhythm: Normal rate and regular rhythm.      Heart sounds: Normal heart sounds. No murmur heard.     No friction rub. No gallop.   Pulmonary:      Effort: Pulmonary effort is normal. No respiratory distress.      Breath sounds: No wheezing.   Abdominal:      Palpations: Abdomen is soft.      Tenderness: There is no abdominal tenderness.   Musculoskeletal:         General: No tenderness.   Neurological:      Mental Status: She is alert and oriented to person, place, and time.   Vitals reviewed.            Assessment     Christel Murray is a 29 y.o.  with normal gynecologic exam.     Plan       1.  Routine well woman exam done today.  2.  Pap and HPV:Pap with HPV was done today.  Current ASCCP Guidelines reviewed.   3.  The patient declined STD testing.  Safe sex practices have been discussed.  4. The patient is sexually active. She wished to continue with Paragard for contraception.  5.  Exercise Recommendation: The importance of regular exercise/physical activity was discussed. Recommend exercise 3-5 times per week for at least 30 minutes.   6. Patient to return to office in 12 months for annual exam.

## 2024-07-16 NOTE — PROGRESS NOTES
Ambulatory Visit  Name: Christel Murray      : 1995      MRN: [id number]  Encounter Provider: Kaity Echevarria MD  Encounter Date: 2024   Encounter department: [facility]    Assessment & Plan   1. Attention deficit hyperactivity disorder (ADHD), predominantly inattentive type  -     amphetamine-dextroamphetamine (ADDERALL, 20MG,) 20 mg tablet; One tab in the afternoon  -     amphetamine-dextroamphetamine (ADDERALL XR) 20 MG 24 hr capsule; Take 1 capsule (20 mg total) by mouth every morning Max Daily Amount: 20 mg  2. Scalp irritation  Assessment & Plan:  She was given ketoconazole shampoo.    Orders:  -     ketoconazole (NIZORAL) 2 % shampoo; Apply 1 Application topically 2 (two) times a week  3. Class 2 obesity due to excess calories without serious comorbidity with body mass index (BMI) of 36.0 to 36.9 in adult  Assessment & Plan:  Improving.  Continue on Wegovy 2.4 mg weekly.  Discussed about low-carb diet and regular exercise.  Come back in 3 months for follow-up.       History of Present Illness     She is here today for follow-up for weight management and ADHD.  She has been on Wegovy 2.4 mg weekly.  She lost 15 pounds since her last visit.  She denies any complaint other than nauseous feeling.  She stated Adderall is working well for her.      Review of Systems   Constitutional:  Negative for chills and fever.   HENT:  Negative for trouble swallowing.    Eyes:  Negative for visual disturbance.   Respiratory:  Negative for cough and shortness of breath.    Cardiovascular:  Negative for chest pain, palpitations and leg swelling.   Gastrointestinal:  Negative for abdominal pain, constipation and diarrhea.   Endocrine: Negative for cold intolerance and heat intolerance.   Genitourinary:  Negative for difficulty urinating and dysuria.   Musculoskeletal:  Negative for gait problem.   Skin:  Positive for rash (scalp).   Neurological:  Negative for dizziness, tremors, seizures and  headaches.   Hematological:  Negative for adenopathy.   Psychiatric/Behavioral:  Negative for behavioral problems.      History reviewed. No pertinent past medical history.  Past Surgical History:   Procedure Laterality Date   • TONSILLECTOMY     • WISDOM TOOTH EXTRACTION       Family History   Problem Relation Age of Onset   • ADD / ADHD Mother    • Spina bifida Mother    • Diabetes Father    • Stroke Father    • Polycythemia Father      Social History     Tobacco Use   • Smoking status: Never   • Smokeless tobacco: Never   Vaping Use   • Vaping status: Never Used   Substance and Sexual Activity   • Alcohol use: Never   • Drug use: Never   • Sexual activity: Yes     Birth control/protection: I.U.D.     Current Outpatient Medications on File Prior to Visit   Medication Sig   • Albuterol Sulfate 108 (90 Base) MCG/ACT AEPB    • cetirizine (ZyrTEC) 10 mg tablet Take 10 mg by mouth daily   • cholecalciferol (VITAMIN D3) 400 units tablet Take by mouth daily   • EpiPen 2-Liang 0.3 MG/0.3ML SOAJ Inject 0.3 mg into a muscle once   • fluticasone (Flonase Allergy Relief) 50 mcg/act nasal spray    • guaiFENesin-dextromethorphan (MUCINEX DM) 600-30 mg  (Patient not taking: Reported on 7/16/2024)   • hydrOXYzine HCL (ATARAX) 10 mg tablet Take 1 tablet (10 mg total) by mouth every 6 (six) hours as needed for itching   • montelukast (SINGULAIR) 10 mg tablet Take 1 tablet (10 mg total) by mouth daily at bedtime (Patient not taking: Reported on 7/16/2024)   • Olopatadine HCl (Pataday) 0.7 % SOLN    • ondansetron (ZOFRAN) 4 mg tablet Take 1 tablet (4 mg total) by mouth every 8 (eight) hours as needed for nausea or vomiting   • pantoprazole (PROTONIX) 40 mg tablet Take 1 tablet (40 mg total) by mouth daily before breakfast   • PARAGARD INTRAUTERINE COPPER IU    • Semaglutide-Weight Management (Wegovy) 2.4 MG/0.75ML Inject 2.4 mg under the skin weekly   • [DISCONTINUED] amphetamine-dextroamphetamine (ADDERALL XR) 20 MG 24 hr capsule Take  1 capsule (20 mg total) by mouth every morning Max Daily Amount: 20 mg   • [DISCONTINUED] amphetamine-dextroamphetamine (ADDERALL, 20MG,) 20 mg tablet One tab in the afternoon   • [DISCONTINUED] triamcinolone (KENALOG) 0.5 % cream APPLY TOPICALLY 3 (THREE) TIMES A DAY     Allergies   Allergen Reactions   • Pollen Extract Allergic Rhinitis, Cough, Eye Swelling, Hives, Itching and Sneezing   • Cephalosporins Rash     Immunization History   Administered Date(s) Administered   • COVID-19 PFIZER VACCINE 0.3 ML IM 12/21/2020, 01/14/2021, 12/20/2021   • DTP 1994, 1994, 1994, 10/25/1995, 06/15/1999   • DTaP 1994, 1994, 1994, 10/25/1995, 06/15/1999   • Fluzone Split Quad 0.25 mL 11/06/1998, 10/19/1999, 11/18/2002, 10/20/2003   • H1N1, All Formulations 10/27/2009   • HPV Quadrivalent 08/29/2007, 10/30/2007, 03/10/2008   • HPV9 08/29/2007, 10/30/2007, 03/10/2008   • Hep A, ped/adol, 2 dose 08/29/2007, 03/10/2008   • Hep B, Adolescent or Pediatric 1994, 1994, 1994   • Hep B, adult 1994, 1994, 1994   • Hepatitis A 08/29/2007, 03/10/2008   • HiB 1994, 1994, 04/07/1995   • INFLUENZA 11/06/1998, 10/19/1999, 11/18/2002, 10/20/2003, 10/18/2004, 10/18/2004, 10/25/2005, 10/25/2005, 11/06/2006, 10/30/2007, 10/01/2012, 10/28/2014, 12/21/2015, 12/21/2015, 09/21/2016, 09/21/2016, 10/17/2019, 10/17/2019, 10/30/2020, 10/30/2020, 11/12/2022, 10/31/2023, 10/31/2023   • IPV 1994, 1994, 10/25/1995, 06/15/1999   • MMR 06/21/1995, 04/16/1998   • Meningococcal MCV4, Unspecified 07/29/2005, 06/11/2012   • OPV 1994   • Pneumococcal Conjugate 13-Valent 05/30/2000   • Pneumococcal Conjugate PCV 7 05/30/2000   • Pneumococcal Polysaccharide PPV23 11/18/2019   • Tdap 02/16/2010, 07/28/2022   • Tuberculin Skin Test-PPD Intradermal 01/15/1995, 09/21/2016, 08/21/2017   • Varicella 11/27/1995   • Zoster 12/15/2016     Objective     /70 (BP Location:  "Left arm, Patient Position: Sitting, Cuff Size: Large)   Pulse 77   Temp 98.4 °F (36.9 °C) (Tympanic)   Resp 16   Ht 5' 2\" (1.575 m)   Wt 92.5 kg (204 lb)   LMP 07/06/2024 (Exact Date)   SpO2 99%   BMI 37.31 kg/m²     Physical Exam  Vitals and nursing note reviewed.   Constitutional:       Appearance: She is well-developed.   HENT:      Head: Normocephalic and atraumatic.   Eyes:      Pupils: Pupils are equal, round, and reactive to light.   Cardiovascular:      Rate and Rhythm: Normal rate and regular rhythm.      Heart sounds: Normal heart sounds.   Pulmonary:      Effort: Pulmonary effort is normal.      Breath sounds: Normal breath sounds.   Abdominal:      General: Bowel sounds are normal.      Palpations: Abdomen is soft.   Musculoskeletal:      Cervical back: Normal range of motion and neck supple.   Lymphadenopathy:      Cervical: No cervical adenopathy.   Skin:     General: Skin is warm.      Findings: Erythema present.   Neurological:      Mental Status: She is alert and oriented to person, place, and time.         "

## 2024-07-16 NOTE — ASSESSMENT & PLAN NOTE
Improving.  Continue on Wegovy 2.4 mg weekly.  Discussed about low-carb diet and regular exercise.  Come back in 3 months for follow-up.

## 2024-07-17 LAB
HPV HR 12 DNA CVX QL NAA+PROBE: NEGATIVE
HPV16 DNA CVX QL NAA+PROBE: NEGATIVE
HPV18 DNA CVX QL NAA+PROBE: NEGATIVE

## 2024-07-23 DIAGNOSIS — K21.9 GASTROESOPHAGEAL REFLUX DISEASE WITHOUT ESOPHAGITIS: ICD-10-CM

## 2024-07-23 LAB
LAB AP GYN PRIMARY INTERPRETATION: NORMAL
Lab: NORMAL

## 2024-07-23 RX ORDER — PANTOPRAZOLE SODIUM 40 MG/1
40 TABLET, DELAYED RELEASE ORAL
Qty: 30 TABLET | Refills: 5 | Status: SHIPPED | OUTPATIENT
Start: 2024-07-23

## 2024-07-31 DIAGNOSIS — E66.01 CLASS 3 SEVERE OBESITY DUE TO EXCESS CALORIES WITHOUT SERIOUS COMORBIDITY WITH BODY MASS INDEX (BMI) OF 40.0 TO 44.9 IN ADULT (HCC): ICD-10-CM

## 2024-08-01 RX ORDER — SEMAGLUTIDE 2.4 MG/.75ML
INJECTION, SOLUTION SUBCUTANEOUS
Qty: 3 ML | Refills: 3 | Status: SHIPPED | OUTPATIENT
Start: 2024-08-01

## 2024-08-23 DIAGNOSIS — R11.0 NAUSEA: ICD-10-CM

## 2024-08-23 RX ORDER — ONDANSETRON 4 MG/1
4 TABLET, FILM COATED ORAL EVERY 8 HOURS PRN
Qty: 20 TABLET | Refills: 2 | Status: SHIPPED | OUTPATIENT
Start: 2024-08-23

## 2024-09-03 DIAGNOSIS — Z23 IMMUNIZATION DUE: ICD-10-CM

## 2024-09-04 RX ORDER — ZOSTER VACCINE RECOMBINANT, ADJUVANTED 50 MCG/0.5
0.5 KIT INTRAMUSCULAR ONCE
Qty: 1 EACH | Refills: 1 | Status: SHIPPED | OUTPATIENT
Start: 2024-09-04 | End: 2024-09-04

## 2024-09-24 DIAGNOSIS — R11.0 NAUSEA: ICD-10-CM

## 2024-09-24 RX ORDER — ONDANSETRON 4 MG/1
4 TABLET, FILM COATED ORAL EVERY 8 HOURS PRN
Qty: 20 TABLET | Refills: 2 | Status: SHIPPED | OUTPATIENT
Start: 2024-09-24

## 2024-09-30 DIAGNOSIS — F90.0 ATTENTION DEFICIT HYPERACTIVITY DISORDER (ADHD), PREDOMINANTLY INATTENTIVE TYPE: ICD-10-CM

## 2024-10-01 RX ORDER — DEXTROAMPHETAMINE SACCHARATE, AMPHETAMINE ASPARTATE, DEXTROAMPHETAMINE SULFATE AND AMPHETAMINE SULFATE 5; 5; 5; 5 MG/1; MG/1; MG/1; MG/1
TABLET ORAL
Qty: 30 TABLET | Refills: 0 | Status: SHIPPED | OUTPATIENT
Start: 2024-10-01

## 2024-10-01 RX ORDER — DEXTROAMPHETAMINE SACCHARATE, AMPHETAMINE ASPARTATE MONOHYDRATE, DEXTROAMPHETAMINE SULFATE AND AMPHETAMINE SULFATE 5; 5; 5; 5 MG/1; MG/1; MG/1; MG/1
20 CAPSULE, EXTENDED RELEASE ORAL EVERY MORNING
Qty: 30 CAPSULE | Refills: 0 | Status: SHIPPED | OUTPATIENT
Start: 2024-10-01

## 2024-10-01 NOTE — TELEPHONE ENCOUNTER
Pt last seen 24 and I copied past refills into chart from pdmp web site and sent to provider for approval    elect Patient Id Name  [facility] State   [] 1 BRENNON MADHURI 1995 F [id number] [facility] [facility]   [] 2 BRENNON MADHURI 1995 F [id number] [facility] [id number] [place]           Search Criteria    Name Date of Birth Date Range   brennon arandar 1995 10- To 10-     Requester Name Requested Date   [facility])     Summary  Prescriptions  11  Prescribers  2  Pharmacies  1  View Map  Drug Classes  Benzodiazepines  0  Stimulants  11  Opioids  0  Muscle Relaxants  0  Opioid Dosage  Total MME for Active Prescriptions  0    Average MME  0.00  MME Graph   MME Calculator     Prescriptions  Notifications    Prescribers  Pharmacies  MME Graph    [] [place] Drug Categories:     [] Stimulants      Showentries  Search:  Patient Id Prescription # Filled Written Drug Label Qty Days Strength MME** Prescriber Pharmacy Payment REFILL #/Auth State Detail   1 [id number] 2024 Amphetamine Salt Combo (Tablet) 30.0 30 20 MG NA GEORGETTE) [facility] Commercial [place] 0 / 0 [place]    1 [id number] 2024 Mixed Amphetamine Salts (Capsule, Extended Release) 30.0 30 20 MG NA GEORGETTE) [facility] Commercial [place] 0 / 0 [place]    1 [id number] 2024 Amphetamine Salt Combo (Tablet) 30.0 30 20 MG NA GEORGETTE) [facility] Commercial [place] 0 / 0 [place]    1 [id number] 2024 Mixed Amphetamine Salts (Capsule, Extended Release) 30.0 30 20 MG NA GEORGETTE) [facility] Commercial [place] 0 / 0 [place]    1 [id number] 04/15/2024 04/15/2024 Amphetamine Salt Combo (Tablet) 30.0 30 20 MG NA GEORGETTE) [facility] Commercial [place] 0 / 0 [place]    1 [id number] 04/15/2024 04/15/2024 Mixed Amphetamine Salts (Capsule,  Extended Release) 30.0 30 20 MG NA KAREN(MD) [facility] Commercial [place] 0 / 0 [place]    1 [id number] 03/11/2024 03/09/2024 Amphetamine Salt Combo (Tablet) 30.0 30 20 MG NA KAREN(MD) [facility] Commercial [place] 0 / 0 [place]    1 [id number] 03/11/2024 03/09/2024 Mixed Amphetamine Salts (Capsule, Extended Release) 30.0 30 20 MG NA KAREN(MD) [facility] Commercial [place] 0 / 0 [place]    2 [id number] 01/31/2024 01/31/2024 Mixed Amphetamine Salt (Capsule, Extended Release) 30.0 30 20 MG NA WHIT ROSALES [facility] Commercial Insurance 0 / 0 [place]    2 [id number] 12/05/2023 12/05/2023 Mixed Amphetamine Salts (Capsule, Extended Release) 30.0 30 20 MG NA [facility] Commercial Insurance 0 / 0 [place]    Showing 1 to 10 of 11 entries  [id number]     * Per CDC guidance, the conversion factors and associated daily morphine milligram equivalents for drugs prescribed as part of medication-assisted treatment for opioid use disorder should not be used to benchmark against dosage thresholds meant for opioids prescribed for pain.  Report Disclaimer:  Information from the [place] Prescription Drug Monitoring Program (PDMP) database is protected health information and any information accessed must be treated as confidential. Any person who knowingly or intentionally makes an unauthorized disclosure of information from the PDMP database will be subject to civil and criminal penalties as set forth in the ABC-MAP Act 191 of 2014; Act of Oct. 27, 2014, P.L. 2911, No. 191.    The information in the PDMP database is submitted by pharmacies and may contain errors and omissions. Additionally, pharmacies may submit extraneous non-controlled substance dispensations to the PDMP database; if a non-controlled substance is present on one patient’s Prescription Report, it should not be assumed that this same medication will be  reported on another patient’s Prescription Report. Independent verification of prescription information with pharmacies and prescribers may sometimes be prudent or necessary.  Educational content  [facility] MME calculation guidelines  [place] Prescribing Guidelines  Letter Regarding the Misapplication of Prescribing Guidelines   Guide for Appropriate Tapering or Discontinuation of Long-Term Opioid Use   #[id number]

## 2024-10-09 ENCOUNTER — OFFICE VISIT (OUTPATIENT)
Dept: FAMILY MEDICINE CLINIC | Facility: CLINIC | Age: 29
End: 2024-10-09
Payer: COMMERCIAL

## 2024-10-09 VITALS
OXYGEN SATURATION: 99 % | WEIGHT: 193 LBS | HEIGHT: 63 IN | TEMPERATURE: 97.2 F | SYSTOLIC BLOOD PRESSURE: 114 MMHG | HEART RATE: 90 BPM | RESPIRATION RATE: 16 BRPM | DIASTOLIC BLOOD PRESSURE: 72 MMHG | BODY MASS INDEX: 34.2 KG/M2

## 2024-10-09 DIAGNOSIS — E66.09 CLASS 1 OBESITY DUE TO EXCESS CALORIES WITHOUT SERIOUS COMORBIDITY WITH BODY MASS INDEX (BMI) OF 34.0 TO 34.9 IN ADULT: ICD-10-CM

## 2024-10-09 DIAGNOSIS — G43.809 OTHER MIGRAINE WITHOUT STATUS MIGRAINOSUS, NOT INTRACTABLE: ICD-10-CM

## 2024-10-09 DIAGNOSIS — E66.811 CLASS 1 OBESITY DUE TO EXCESS CALORIES WITHOUT SERIOUS COMORBIDITY WITH BODY MASS INDEX (BMI) OF 34.0 TO 34.9 IN ADULT: ICD-10-CM

## 2024-10-09 DIAGNOSIS — F90.0 ATTENTION DEFICIT HYPERACTIVITY DISORDER (ADHD), PREDOMINANTLY INATTENTIVE TYPE: ICD-10-CM

## 2024-10-09 DIAGNOSIS — R23.8 SCALP IRRITATION: Primary | ICD-10-CM

## 2024-10-09 PROCEDURE — 99213 OFFICE O/P EST LOW 20 MIN: CPT | Performed by: FAMILY MEDICINE

## 2024-10-09 RX ORDER — CLOBETASOL PROPIONATE 0.5 MG/ML
SOLUTION TOPICAL 2 TIMES DAILY
Qty: 50 ML | Refills: 1 | Status: SHIPPED | OUTPATIENT
Start: 2024-10-09

## 2024-10-10 NOTE — ASSESSMENT & PLAN NOTE
Orders:    clobetasol (TEMOVATE) 0.05 % external solution; Apply topically 2 (two) times a day

## 2024-10-10 NOTE — ASSESSMENT & PLAN NOTE
Not improving.  Was given prescription for clobetasol scalp solution.  She is to continue on Nizoral and start on the clobetasol.  If not improving call or come back.  I will consider referral to dermatologist.

## 2024-10-10 NOTE — ASSESSMENT & PLAN NOTE
Improving.  She lost 11 pounds of visit.  Continue on Wegovy 2.4 mg weekly.  We will follow-up in 3 to 4 months.

## 2024-10-10 NOTE — PROGRESS NOTES
Ambulatory Visit  Name: Christel Murray      : 1995      MRN: [id number]  Encounter Provider: Kaity Echevarria MD  Encounter Date: 10/9/2024   Encounter department: [facility]    Assessment & Plan  Scalp irritation    Orders:  •  clobetasol (TEMOVATE) 0.05 % external solution; Apply topically 2 (two) times a day         History of Present Illness     She is here today for follow-up for weight management ADHD.  She has been taking her medications.  She has lost 11 pounds since last visit.  Her Adderall is working well for her ADHD.  She denies any side effect.  She continues to have a problem with her scalp rash and she stated Nizoral is not helping.  She denies any other complaint.  Review of Systems   Constitutional:  Negative for chills and fever.   HENT:  Negative for trouble swallowing.    Eyes:  Negative for visual disturbance.   Respiratory:  Negative for cough and shortness of breath.    Cardiovascular:  Negative for chest pain, palpitations and leg swelling.   Gastrointestinal:  Negative for abdominal pain, constipation and diarrhea.   Endocrine: Negative for cold intolerance and heat intolerance.   Genitourinary:  Negative for difficulty urinating and dysuria.   Musculoskeletal:  Negative for gait problem.   Skin:  Negative for rash.   Neurological:  Negative for dizziness, tremors, seizures and headaches.   Hematological:  Negative for adenopathy.   Psychiatric/Behavioral:  Negative for behavioral problems.      History reviewed. No pertinent past medical history.  Past Surgical History:   Procedure Laterality Date   • TONSILLECTOMY     • WISDOM TOOTH EXTRACTION       Family History   Problem Relation Age of Onset   • ADD / ADHD Mother    • Spina bifida Mother    • Diabetes Father    • Stroke Father    • Polycythemia Father      Social History     Tobacco Use   • Smoking status: Never   • Smokeless tobacco: Never   Vaping Use   • Vaping status: Never Used   Substance and  Sexual Activity   • Alcohol use: Never   • Drug use: Never   • Sexual activity: Yes     Birth control/protection: I.U.D.     Current Outpatient Medications on File Prior to Visit   Medication Sig   • Albuterol Sulfate 108 (90 Base) MCG/ACT AEPB    • amphetamine-dextroamphetamine (ADDERALL XR) 20 MG 24 hr capsule Take 1 capsule (20 mg total) by mouth every morning Max Daily Amount: 20 mg   • amphetamine-dextroamphetamine (ADDERALL, 20MG,) 20 mg tablet One tab in the afternoon   • cetirizine (ZyrTEC) 10 mg tablet Take 10 mg by mouth daily   • cholecalciferol (VITAMIN D3) 400 units tablet Take by mouth daily   • EpiPen 2-Liang 0.3 MG/0.3ML SOAJ Inject 0.3 mg into a muscle once   • fluticasone (Flonase Allergy Relief) 50 mcg/act nasal spray    • hydrOXYzine HCL (ATARAX) 10 mg tablet Take 1 tablet (10 mg total) by mouth every 6 (six) hours as needed for itching   • ketoconazole (NIZORAL) 2 % shampoo Apply 1 Application topically 2 (two) times a week   • Olopatadine HCl (Pataday) 0.7 % SOLN    • ondansetron (ZOFRAN) 4 mg tablet TAKE 1 TABLET (4 MG TOTAL) BY MOUTH EVERY 8 (EIGHT) HOURS AS NEEDED FOR NAUSEA OR VOMITING   • pantoprazole (PROTONIX) 40 mg tablet TAKE 1 TABLET (40 MG TOTAL) BY MOUTH DAILY BEFORE BREAKFAST   • PARAGARD INTRAUTERINE COPPER IU    • Wegovy 2.4 MG/0.75ML INJECT 2.4 MG UNDER THE SKIN WEEKLY   • guaiFENesin-dextromethorphan (MUCINEX DM) 600-30 mg  (Patient not taking: Reported on 7/16/2024)   • montelukast (SINGULAIR) 10 mg tablet Take 1 tablet (10 mg total) by mouth daily at bedtime (Patient not taking: Reported on 7/16/2024)     Allergies   Allergen Reactions   • Pollen Extract Allergic Rhinitis, Cough, Eye Swelling, Hives, Itching and Sneezing   • Cephalosporins Rash     Immunization History   Administered Date(s) Administered   • COVID-19 PFIZER VACCINE 0.3 ML IM 12/21/2020, 01/14/2021, 12/20/2021   • DTP 1994, 1994, 1994, 10/25/1995, 06/15/1999   • DTaP 1994, 1994,  "1994, 10/25/1995, 06/15/1999   • Fluzone Split Quad 0.25 mL 11/06/1998, 10/19/1999, 11/18/2002, 10/20/2003   • H1N1, All Formulations 10/27/2009   • HPV Quadrivalent 08/29/2007, 10/30/2007, 03/10/2008   • HPV9 08/29/2007, 10/30/2007, 03/10/2008   • Hep A, ped/adol, 2 dose 08/29/2007, 03/10/2008   • Hep B, Adolescent or Pediatric 1994, 1994, 1994   • Hep B, adult 1994, 1994, 1994   • Hepatitis A 08/29/2007, 03/10/2008   • HiB 1994, 1994, 04/07/1995   • INFLUENZA 11/06/1998, 10/19/1999, 11/18/2002, 10/20/2003, 10/18/2004, 10/18/2004, 10/25/2005, 10/25/2005, 11/06/2006, 10/30/2007, 10/01/2012, 10/28/2014, 12/21/2015, 12/21/2015, 09/21/2016, 09/21/2016, 10/17/2019, 10/17/2019, 10/30/2020, 10/30/2020, 11/12/2022, 10/31/2023, 10/31/2023   • IPV 1994, 1994, 10/25/1995, 06/15/1999   • MMR 06/21/1995, 04/16/1998   • Meningococcal MCV4, Unspecified 07/29/2005, 06/11/2012   • OPV 1994   • Pneumococcal Conjugate 13-Valent 05/30/2000   • Pneumococcal Conjugate PCV 7 05/30/2000   • Pneumococcal Polysaccharide PPV23 11/18/2019   • Tdap 02/16/2010, 07/28/2022   • Tuberculin Skin Test-PPD Intradermal 01/15/1995, 09/21/2016, 08/21/2017   • Varicella 11/27/1995   • Zoster 12/15/2016     Objective     /72 (BP Location: Left arm, Patient Position: Sitting, Cuff Size: Standard)   Pulse 90   Temp (!) 97.2 °F (36.2 °C) (Tympanic)   Resp 16   Ht 5' 3\" (1.6 m)   Wt 87.5 kg (193 lb)   SpO2 99%   BMI 34.19 kg/m²     Physical Exam  Vitals and nursing note reviewed.   Constitutional:       Appearance: Normal appearance. She is well-developed.   HENT:      Head: Normocephalic and atraumatic.   Eyes:      Pupils: Pupils are equal, round, and reactive to light.   Cardiovascular:      Rate and Rhythm: Normal rate and regular rhythm.      Heart sounds: Normal heart sounds.   Pulmonary:      Effort: Pulmonary effort is normal.      Breath sounds: Normal breath sounds. "   Abdominal:      General: Bowel sounds are normal.      Palpations: Abdomen is soft.   Musculoskeletal:      Cervical back: Normal range of motion and neck supple.   Lymphadenopathy:      Cervical: No cervical adenopathy.   Skin:     General: Skin is warm.   Neurological:      Mental Status: She is alert and oriented to person, place, and time.

## 2024-11-01 DIAGNOSIS — Z23 IMMUNIZATION DUE: ICD-10-CM

## 2024-11-01 DIAGNOSIS — E66.813 CLASS 3 SEVERE OBESITY DUE TO EXCESS CALORIES WITHOUT SERIOUS COMORBIDITY WITH BODY MASS INDEX (BMI) OF 40.0 TO 44.9 IN ADULT (HCC): ICD-10-CM

## 2024-11-01 DIAGNOSIS — E66.01 CLASS 3 SEVERE OBESITY DUE TO EXCESS CALORIES WITHOUT SERIOUS COMORBIDITY WITH BODY MASS INDEX (BMI) OF 40.0 TO 44.9 IN ADULT (HCC): ICD-10-CM

## 2024-11-04 RX ORDER — SEMAGLUTIDE 2.4 MG/.75ML
INJECTION, SOLUTION SUBCUTANEOUS
Qty: 3 ML | Refills: 3 | Status: SHIPPED | OUTPATIENT
Start: 2024-11-04

## 2024-11-04 RX ORDER — ZOSTER VACCINE RECOMBINANT, ADJUVANTED 50 MCG/0.5
0.5 KIT INTRAMUSCULAR ONCE
Qty: 1 EACH | Refills: 1 | Status: SHIPPED | OUTPATIENT
Start: 2024-11-04 | End: 2024-11-04

## 2024-11-25 DIAGNOSIS — R11.0 NAUSEA: ICD-10-CM

## 2024-11-25 DIAGNOSIS — F90.0 ATTENTION DEFICIT HYPERACTIVITY DISORDER (ADHD), PREDOMINANTLY INATTENTIVE TYPE: ICD-10-CM

## 2024-11-25 DIAGNOSIS — R23.8 SCALP IRRITATION: ICD-10-CM

## 2024-11-25 DIAGNOSIS — J30.89 ALLERGIC RHINITIS DUE TO OTHER ALLERGIC TRIGGER, UNSPECIFIED SEASONALITY: Primary | ICD-10-CM

## 2024-11-27 RX ORDER — DEXTROAMPHETAMINE SACCHARATE, AMPHETAMINE ASPARTATE, DEXTROAMPHETAMINE SULFATE AND AMPHETAMINE SULFATE 5; 5; 5; 5 MG/1; MG/1; MG/1; MG/1
TABLET ORAL
Qty: 30 TABLET | Refills: 0 | Status: SHIPPED | OUTPATIENT
Start: 2024-11-27

## 2024-11-27 RX ORDER — DEXTROAMPHETAMINE SACCHARATE, AMPHETAMINE ASPARTATE MONOHYDRATE, DEXTROAMPHETAMINE SULFATE AND AMPHETAMINE SULFATE 5; 5; 5; 5 MG/1; MG/1; MG/1; MG/1
20 CAPSULE, EXTENDED RELEASE ORAL EVERY MORNING
Qty: 30 CAPSULE | Refills: 0 | Status: SHIPPED | OUTPATIENT
Start: 2024-11-27

## 2024-11-27 RX ORDER — FLUTICASONE PROPIONATE 50 MCG
2 SPRAY, SUSPENSION (ML) NASAL DAILY
Qty: 16 G | Refills: 3 | Status: SHIPPED | OUTPATIENT
Start: 2024-11-27

## 2024-11-27 RX ORDER — CLOBETASOL PROPIONATE 0.5 MG/ML
SOLUTION TOPICAL 2 TIMES DAILY
Qty: 50 ML | Refills: 1 | Status: SHIPPED | OUTPATIENT
Start: 2024-11-27

## 2024-11-27 RX ORDER — KETOCONAZOLE 20 MG/ML
1 SHAMPOO, SUSPENSION TOPICAL 2 TIMES WEEKLY
Qty: 120 ML | Refills: 0 | Status: SHIPPED | OUTPATIENT
Start: 2024-11-28

## 2024-11-27 RX ORDER — ONDANSETRON 4 MG/1
4 TABLET, FILM COATED ORAL EVERY 8 HOURS PRN
Qty: 20 TABLET | Refills: 5 | Status: SHIPPED | OUTPATIENT
Start: 2024-11-27

## 2024-11-27 RX ORDER — CETIRIZINE HYDROCHLORIDE 10 MG/1
10 TABLET ORAL DAILY
Qty: 30 TABLET | Refills: 0 | Status: SHIPPED | OUTPATIENT
Start: 2024-11-27

## 2024-11-27 NOTE — TELEPHONE ENCOUNTER
Refills have been requested for the following medications:         cetirizine (ZyrTEC) 10 mg tablet         fluticasone (Flonase Allergy Relief) 50 mcg/act nasal spray         ketoconazole (NIZORAL) 2 % shampoo [Karen Gamboa MD]         ondansetron (ZOFRAN) 4 mg tablet [Karen Gamboa MD]         amphetamine-dextroamphetamine (ADDERALL, 20MG,) 20 mg tablet [Karen Gamboa MD]         amphetamine-dextroamphetamine (ADDERALL XR) 20 MG 24 hr capsule [Karen Gamboa MD]         clobetasol (TEMOVATE) 0.05 % external solution [Karen Gamboa MD]     Preferred pharmacy: [facility]  [id number] [facility]    Last seen 10/9/24     1 BRENNON MADHURI 1995 F [id number] [facility] [facility]   2 [facility] MADHURI 1995 F [address] [facility] [facility]      Search Criteria  Name Date of Birth Date Range  Brennon Madhuri 1995 11- To 11-  Requester Name Requested Date  KAREN GAMBOA 11- 13:38:03 ([facility])  Summary  Prescriptions  12  Prescribers  2  Pharmacies  1  Drug Classes  Benzodiazepines  0  Stimulants  12  Opioids  0  Muscle Relaxants  0  Opioid Dosage  Total MME for Active Prescriptions  0    Average MME  0.00         Prescriptions  Notifications    Prescribers  Pharmacies  MME Graph    Show All     [place]   Drug Categories:      Stimulants     Show  10  entries  Search:  Patient Id Prescription # Filled Written Drug Label Qty Days Strength MME** Prescriber Pharmacy Payment REFILL #/Auth State Detail  1 [id number] 10/02/2024 10/01/2024 Amphetamine Salt Combo (Tablet) 30.0 30 20 MG NA GEORGETTE) ALEX[facility]BRODER[facility] Commercial Insurance 0 / 0 [place]   1 [id number] 10/02/2024 10/01/2024 Mixed Amphetamine Salts (Capsule, Extended Release) 30.0 30 20 MG NA GEORGETTE) [facility] Commercial Insurance 0 / 0 [place]   1 [id number] 07/16/2024 07/16/2024 Amphetamine Salt Combo (Tablet) 30.0 30 20 MG NA GEORGETTE) [facility] Commercial Insurance 0 / 0 [place]   1 [id number] 07/16/2024 07/16/2024 Mixed Amphetamine Salts (Capsule, Extended Release) 30.0 30 20 MG NA WASGREGOR(MD) [facility] Commercial Insurance 0 / 0 [place]   1 [id number] 06/05/2024 06/05/2024 Amphetamine Salt Combo (Tablet) 30.0 30 20 MG NA WASSIM(MD) [facility] Commercial Insurance 0 / 0 [place]   1 [id number] 06/05/2024 06/05/2024 Mixed Amphetamine Salts (Capsule, Extended Release) 30.0 30 20 MG NA WASPATITOM(MD) [facility] Commercial Insurance 0 / 0 [place]   1 [id number] 04/15/2024 04/15/2024 Amphetamine Salt Combo (Tablet) 30.0 30 20 MG NA WASSIM(MD) [facility] Commercial Insurance 0 / 0 [place]   1 [id number] 04/15/2024 04/15/2024 Mixed Amphetamine Salts (Capsule, Extended Release) 30.0 30 20 MG NA GEORGETTE) [facility] Commercial Insurance 0 / 0 [place]   1 [id number] 03/11/2024 03/09/2024 Amphetamine Salt Combo (Tablet) 30.0 30 20 MG NA GEORGETTE) [facility] Commercial Insurance 0 / 0 [place]   1 [id number] 03/11/2024 03/09/2024 Mixed Amphetamine Salts (Capsule, Extended Release) 30.0 30 20 MG NA GEORGETTE) [facility] Commercial Insurance 0 / 0 [place]

## 2024-12-11 ENCOUNTER — HOSPITAL ENCOUNTER (EMERGENCY)
Facility: HOSPITAL | Age: 29
Discharge: HOME | End: 2024-12-11
Attending: EMERGENCY MEDICINE
Payer: OTHER MISCELLANEOUS

## 2024-12-11 ENCOUNTER — APPOINTMENT (EMERGENCY)
Dept: RADIOLOGY | Facility: HOSPITAL | Age: 29
End: 2024-12-11
Payer: OTHER MISCELLANEOUS

## 2024-12-11 VITALS
RESPIRATION RATE: 18 BRPM | TEMPERATURE: 98.7 F | OXYGEN SATURATION: 99 % | SYSTOLIC BLOOD PRESSURE: 126 MMHG | WEIGHT: 178.13 LBS | HEART RATE: 107 BPM | BODY MASS INDEX: 31.55 KG/M2 | DIASTOLIC BLOOD PRESSURE: 82 MMHG

## 2024-12-11 DIAGNOSIS — S63.501A SPRAIN OF RIGHT WRIST, INITIAL ENCOUNTER: Primary | ICD-10-CM

## 2024-12-11 PROCEDURE — 99283 EMERGENCY DEPT VISIT LOW MDM: CPT

## 2024-12-11 PROCEDURE — 99284 EMERGENCY DEPT VISIT MOD MDM: CPT | Performed by: EMERGENCY MEDICINE

## 2024-12-11 PROCEDURE — 73110 X-RAY EXAM OF WRIST: CPT

## 2024-12-11 NOTE — Clinical Note
Christel Cedeñor was seen and treated in our emergency department on 12/11/2024.                Diagnosis:     Christel  may return to work on return date.    She may return on this date: 12/12/2024         If you have any questions or concerns, please don't hesitate to call.      Manuel Forrest MD    ______________________________           _______________          _______________  Hospital Representative                              Date                                Time

## 2024-12-11 NOTE — ED PROVIDER NOTES
Time reflects when diagnosis was documented in both MDM as applicable and the Disposition within this note       Time User Action Codes Description Comment    12/11/2024  8:19 AM Adriane Manuel Add [S63.501A] Sprain of right wrist, initial encounter           ED Disposition       ED Disposition   Discharge    Condition   Stable    Date/Time   Wed Dec 11, 2024  8:19 AM    Comment   Christel Terri discharge to home/self care.                   Assessment & Plan       Medical Decision Making  Problems Addressed:  Sprain of right wrist, initial encounter: acute illness or injury     Details: Pain at wrist s/p traumatic injury.  No obvious deformity on exam.  Xray neg for fx by my read.  N/v intact distally.  Splint, conservative care.     Amount and/or Complexity of Data Reviewed  Radiology: ordered and independent interpretation performed. Decision-making details documented in ED Course.        ED Course as of 12/11/24 0849   Wed Dec 11, 2024   0818 Went over results.  Will splint for comfort.         Medications - No data to display    ED Risk Strat Scores                           SBIRT 20yo+      Flowsheet Row Most Recent Value   Initial Alcohol Screen: US AUDIT-C     1. How often do you have a drink containing alcohol? 0 Filed at: 12/11/2024 0749   2. How many drinks containing alcohol do you have on a typical day you are drinking?  0 Filed at: 12/11/2024 0749   3b. FEMALE Any Age, or MALE 65+: How often do you have 4 or more drinks on one occassion? 0 Filed at: 12/11/2024 0749   Audit-C Score 0 Filed at: 12/11/2024 0749   BYRON: How many times in the past year have you...    Used an illegal drug or used a prescription medication for non-medical reasons? Never Filed at: 12/11/2024 0749                            History of Present Illness       Chief Complaint   Patient presents with    Wrist Injury     Patient c/os of r - wrist pain. Patient was putting restraints on another patient and they believe that they might  have injured it in some sort of way. Pain is 6 out 10.        History reviewed. No pertinent past medical history.   Past Surgical History:   Procedure Laterality Date    TONSILLECTOMY      WISDOM TOOTH EXTRACTION        Family History   Problem Relation Age of Onset    ADD / ADHD Mother     Spina bifida Mother     Diabetes Father     Stroke Father     Polycythemia Father       Social History     Tobacco Use    Smoking status: Never    Smokeless tobacco: Never   Vaping Use    Vaping status: Never Used   Substance Use Topics    Alcohol use: Never    Drug use: Never      E-Cigarette/Vaping    E-Cigarette Use Never User       E-Cigarette/Vaping Substances    Nicotine No     THC No     CBD No     Flavoring No     Other No     Unknown No       I have reviewed and agree with the history as documented.     Patient is a 29-year-old right hand dominant female c/o pain to the right wrist injured while restraining a patient on the [facility] overnight.  No meds taken.  Pain worse with movement.  No meds taken.  No numbness/tingling.          Review of Systems   Constitutional: Negative.    HENT: Negative.     Eyes: Negative.    Respiratory: Negative.     Cardiovascular: Negative.    Gastrointestinal: Negative.    Endocrine: Negative.    Genitourinary: Negative.    Musculoskeletal:  Positive for arthralgias.   Skin: Negative.    Allergic/Immunologic: Negative.    Neurological: Negative.    Hematological: Negative.    Psychiatric/Behavioral: Negative.     All other systems reviewed and are negative.          Objective       ED Triage Vitals [12/11/24 0745]   Temperature Pulse Blood Pressure Respirations SpO2 Patient Position - Orthostatic VS   98.7 °F (37.1 °C) (!) 107 126/82 18 99 % Sitting      Temp Source Heart Rate Source BP Location FiO2 (%) Pain Score    Oral Monitor Left arm -- --      Vitals      Date and Time Temp Pulse SpO2 Resp BP Pain Score FACES Pain Rating User   12/11/24 0745 98.7 °F (37.1 °C) 107 99 % 18 126/82 -- -- JW             Physical Exam  Vitals and nursing note reviewed.   Constitutional:       Appearance: Normal appearance. She is normal weight.   HENT:      Head: Normocephalic and atraumatic.   Cardiovascular:      Rate and Rhythm: Normal rate and regular rhythm.      Pulses: Normal pulses.      Heart sounds: Normal heart sounds.   Pulmonary:      Effort: Pulmonary effort is normal.      Breath sounds: Normal breath sounds.   Musculoskeletal:      Cervical back: Normal range of motion and neck supple.      Comments: No point bony ttp at the wrist.  No hand, snuffbox or elbow pain.  +pan with flexion of wrist.  Mild pain with ulnar and radial deviation   Skin:     General: Skin is warm and dry.      Capillary Refill: Capillary refill takes less than 2 seconds.   Neurological:      General: No focal deficit present.      Mental Status: She is alert and oriented to person, place, and time.      Sensory: No sensory deficit.         Results Reviewed       None            XR wrist 3+ views RIGHT   ED Interpretation by Manuel Forrest MD (12/11 0815)   No fx, dislocation        Final Interpretation by Roberth Amador MD (12/11 0825)      No acute osseous abnormality.         Computerized Assisted Algorithm (CAA) may have been used to analyze all applicable images.            Workstation performed: [id number]             Procedures    ED Medication and Procedure Management   Prior to Admission Medications   Prescriptions Last Dose Informant Patient Reported? Taking?   Albuterol Sulfate 108 (90 Base) MCG/ACT AEPB   Yes No   EpiPen 2-Liang 0.3 MG/0.3ML SOAJ   Yes No   Sig: Inject 0.3 mg into a muscle once   Olopatadine HCl (Pataday) 0.7 % SOLN   Yes No   PARAGARD INTRAUTERINE COPPER IU   Yes No   Wegovy 2.4 MG/0.75ML   No No   Sig: INJECT 2.4 MG UNDER THE SKIN WEEKLY   amphetamine-dextroamphetamine (ADDERALL XR) 20 MG 24 hr capsule   No No   Sig: Take 1 capsule (20 mg total) by mouth every morning Max Daily Amount: 20 mg    amphetamine-dextroamphetamine (ADDERALL, 20MG,) 20 mg tablet   No No   Sig: One tab in the afternoon   cetirizine (ZyrTEC) 10 mg tablet   No No   Sig: Take 1 tablet (10 mg total) by mouth daily   cholecalciferol (VITAMIN D3) 400 units tablet   Yes No   Sig: Take by mouth daily   clobetasol (TEMOVATE) 0.05 % external solution   No No   Sig: Apply topically 2 (two) times a day   fluticasone (Flonase Allergy Relief) 50 mcg/act nasal spray   No No   Si sprays into each nostril daily   guaiFENesin-dextromethorphan (MUCINEX DM) 600-30 mg   Yes No   Patient not taking: Reported on 2024   hydrOXYzine HCL (ATARAX) 10 mg tablet   No No   Sig: Take 1 tablet (10 mg total) by mouth every 6 (six) hours as needed for itching   ketoconazole (NIZORAL) 2 % shampoo   No No   Sig: Apply 1 Application topically 2 (two) times a week   montelukast (SINGULAIR) 10 mg tablet   No No   Sig: Take 1 tablet (10 mg total) by mouth daily at bedtime   Patient not taking: Reported on 2024   ondansetron (ZOFRAN) 4 mg tablet   No No   Sig: Take 1 tablet (4 mg total) by mouth every 8 (eight) hours as needed for nausea or vomiting   pantoprazole (PROTONIX) 40 mg tablet   No No   Sig: TAKE 1 TABLET (40 MG TOTAL) BY MOUTH DAILY BEFORE BREAKFAST      Facility-Administered Medications: None     Discharge Medication List as of 2024  8:19 AM        CONTINUE these medications which have NOT CHANGED    Details   Albuterol Sulfate 108 (90 Base) MCG/ACT AEPB Historical Med      amphetamine-dextroamphetamine (ADDERALL XR) 20 MG 24 hr capsule Take 1 capsule (20 mg total) by mouth every morning Max Daily Amount: 20 mg, Starting 2024, Normal      amphetamine-dextroamphetamine (ADDERALL, 20MG,) 20 mg tablet One tab in the afternoon, Normal      cetirizine (ZyrTEC) 10 mg tablet Take 1 tablet (10 mg total) by mouth daily, Starting 2024, Normal      cholecalciferol (VITAMIN D3) 400 units tablet Take by mouth daily, Historical Med       clobetasol (TEMOVATE) 0.05 % external solution Apply topically 2 (two) times a day, Starting Wed 11/27/2024, Normal      EpiPen 2-Liang 0.3 MG/0.3ML SOAJ Inject 0.3 mg into a muscle once, Starting Tue 12/5/2023, Historical Med      fluticasone (Flonase Allergy Relief) 50 mcg/act nasal spray 2 sprays into each nostril daily, Starting Wed 11/27/2024, Normal      guaiFENesin-dextromethorphan (MUCINEX DM) 600-30 mg Historical Med      hydrOXYzine HCL (ATARAX) 10 mg tablet Take 1 tablet (10 mg total) by mouth every 6 (six) hours as needed for itching, Starting Fri 3/8/2024, Normal      ketoconazole (NIZORAL) 2 % shampoo Apply 1 Application topically 2 (two) times a week, Starting Thu 11/28/2024, Normal      montelukast (SINGULAIR) 10 mg tablet Take 1 tablet (10 mg total) by mouth daily at bedtime, Starting Mon 4/15/2024, Normal      Olopatadine HCl (Pataday) 0.7 % SOLN Historical Med      ondansetron (ZOFRAN) 4 mg tablet Take 1 tablet (4 mg total) by mouth every 8 (eight) hours as needed for nausea or vomiting, Starting Wed 11/27/2024, Normal      pantoprazole (PROTONIX) 40 mg tablet TAKE 1 TABLET (40 MG TOTAL) BY MOUTH DAILY BEFORE BREAKFAST, Starting Tue 7/23/2024, Normal      PARAGARD INTRAUTERINE COPPER IU Historical Med      Wegovy 2.4 MG/0.75ML INJECT 2.4 MG UNDER THE SKIN WEEKLY, Normal           No discharge procedures on file.  ED SEPSIS DOCUMENTATION   Time reflects when diagnosis was documented in both MDM as applicable and the Disposition within this note       Time User Action Codes Description Comment    12/11/2024  8:19 AM Manuel Forrest Add [S63.501A] Sprain of right wrist, initial encounter                  Manuel Forrest MD  12/11/24 0849

## 2024-12-13 ENCOUNTER — APPOINTMENT (OUTPATIENT)
Dept: URGENT CARE | Age: 29
End: 2024-12-13
Payer: OTHER MISCELLANEOUS

## 2024-12-13 PROCEDURE — 99213 OFFICE O/P EST LOW 20 MIN: CPT

## 2024-12-19 ENCOUNTER — APPOINTMENT (OUTPATIENT)
Dept: URGENT CARE | Age: 29
End: 2024-12-19

## 2024-12-23 ENCOUNTER — EVALUATION (OUTPATIENT)
Dept: PHYSICAL THERAPY | Facility: MEDICAL CENTER | Age: 29
End: 2024-12-23
Payer: OTHER MISCELLANEOUS

## 2024-12-23 DIAGNOSIS — M25.531 RIGHT WRIST PAIN: Primary | ICD-10-CM

## 2024-12-23 PROCEDURE — 97161 PT EVAL LOW COMPLEX 20 MIN: CPT | Performed by: PHYSICAL THERAPIST

## 2024-12-24 ENCOUNTER — APPOINTMENT (OUTPATIENT)
Dept: URGENT CARE | Facility: MEDICAL CENTER | Age: 29
End: 2024-12-24
Payer: OTHER MISCELLANEOUS

## 2024-12-24 PROCEDURE — 99214 OFFICE O/P EST MOD 30 MIN: CPT | Performed by: NURSE PRACTITIONER

## 2024-12-27 NOTE — PROGRESS NOTES
PT Evaluation     Today's date: 2024  Patient name: Christel Murray  : 1995  MRN: [id number]  Referring provider: Geetha Ramsey CRNP  Dx:   Encounter Diagnosis     ICD-10-CM    1. Right wrist pain  M25.531                      Assessment  Impairments: abnormal muscle firing, abnormal muscle tone, abnormal or restricted ROM, impaired physical strength, lacks appropriate home exercise program and pain with function    Assessment details: Christel Murray is a 29 y.o. female was evaluated on 2024  for Right wrist pain  (primary encounter diagnosis). Christel Murray has the above listed impairments resulting in functional deficits and negative impact to quality of life.  Patient is appropriate for skilled PT intervention to promote maximal return to function and patient specific goals.      Patient agrees with outlined treatment plan and all questions were answered to their satisfaction.      Understanding of Dx/Px/POC: good     Prognosis: good    Goals  Patient will successfully transition to home exercise program.  Patient will be able to manage symptoms independently.    Christel will regain full strength of R wrist and hand  Christel will return to work without restriction     Plan  Patient would benefit from: skilled PT  Referral necessary: No  Planned modality interventions: thermotherapy: hydrocollator packs    Planned therapy interventions: home exercise program, manual therapy, neuromuscular re-education, patient education, functional ROM exercises, strengthening, stretching, joint mobilization, graded activity, graded exercise, therapeutic exercise, body mechanics training, motor coordination training and activity modification    Frequency: 1x week  Duration in weeks: 12  Treatment plan discussed with: patient        Subjective Evaluation    History of Present Illness  Mechanism of injury: Christel Murray is a 29 y.o. female presenting to therapy with R wrist pain s/p work injury on .  She notes she was  putting restraints on U patient and felt a pain in her wrist during application.  Unsure of exactly what happened that caused the pain.   Pain located mostly on ulnar side of wrist through wrist and finger flexor tendons.   She was seen in ED after injury, radiographs negative and placed in splint.   Now wearing wrist brace for comfort during more aggressive activities.   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    Patient Goals  Patient goals for therapy: decreased pain, increased strength, return to work, return to sport/leisure activities, independence with ADLs/IADLs and increased motion    Pain  Current pain ratin  At best pain ratin  At worst pain ratin  Quality: dull ache, discomfort, sharp and pulling        Objective     Observations     Left Wrist/Hand   Negative for edema and effusion.     Right Wrist/Hand   Negative for edema and effusion.     Palpation   Left   Muscle spasm in the flexor carpi ulnaris and flexor digitorum superficialis.   Tenderness of the flexor carpi ulnaris and flexor digitorum superficialis.     Tenderness     Additional Tenderness Details  No bony tenderness noted     Neurological Testing     Sensation     Wrist/Hand   Left   Intact: light touch    Right   Intact: light touch    Active Range of Motion     Left Wrist   Normal active range of motion    Right Wrist   Normal active range of motion    Strength/Myotome Testing     Left Wrist/Hand   Wrist extension: 5  Wrist flexion: 5  Radial deviation: 5  Ulnar deviation: 5    Right Wrist/Hand   Wrist extension: 5  Wrist flexion: 4+  Radial deviation: 5  Ulnar deviation: 5    Additional Strength Details  Minor pain with finger and wrist flexion       Flowsheet Rows      Flowsheet Row Most Recent Value   PT/OT G-Codes    Current Score 59   Projected Score 73               Precautions: None      Manuals                                                                 Neuro Re-Ed                                                                                                         Ther Ex                                                                                                                     Ther Activity                                       Gait Training                                       Modalities

## 2024-12-30 ENCOUNTER — OFFICE VISIT (OUTPATIENT)
Dept: PHYSICAL THERAPY | Facility: MEDICAL CENTER | Age: 29
End: 2024-12-30
Payer: OTHER MISCELLANEOUS

## 2024-12-30 DIAGNOSIS — M25.531 RIGHT WRIST PAIN: Primary | ICD-10-CM

## 2024-12-30 PROCEDURE — 97161 PT EVAL LOW COMPLEX 20 MIN: CPT | Performed by: PHYSICAL THERAPIST

## 2024-12-30 NOTE — PROGRESS NOTES
Daily Note     Today's date: 2024  Patient name: Christel Cedeñor  : 1995  MRN: [id number]  Referring provider: Geetha Ramsey CRNP  Dx:   Encounter Diagnosis     ICD-10-CM    1. Right wrist pain  M25.531                      Subjective: Christel reports some mild soreness after evaluation, remains on light duty from work       Objective: See treatment diary below      Assessment: Tolerated treatment well. Patient  with good tolerance to TE today, only mild discomfort with resisted finger flexion.  She is much closer to [facility], will transfer there for remainder of care.        Plan: Continue per plan of care.      Precautions: None       Manuals                                                                 Neuro Re-Ed                                                                                                          Ther Ex             Wrist flexion/extension 2#  30            Finger flexion web Red  45            Flex bar  Twist  45            TB row Blue  30                                                                Ther Activity                                       Gait Training                                       Modalities             MHP pre 10' R wrist

## 2025-01-08 ENCOUNTER — EVALUATION (OUTPATIENT)
Dept: PHYSICAL THERAPY | Facility: REHABILITATION | Age: 30
End: 2025-01-08
Payer: OTHER MISCELLANEOUS

## 2025-01-08 DIAGNOSIS — M25.531 RIGHT WRIST PAIN: Primary | ICD-10-CM

## 2025-01-08 PROCEDURE — 97112 NEUROMUSCULAR REEDUCATION: CPT | Performed by: PHYSICAL THERAPIST

## 2025-01-08 PROCEDURE — 97140 MANUAL THERAPY 1/> REGIONS: CPT | Performed by: PHYSICAL THERAPIST

## 2025-01-08 PROCEDURE — 97010 HOT OR COLD PACKS THERAPY: CPT | Performed by: PHYSICAL THERAPIST

## 2025-01-08 NOTE — PROGRESS NOTES
Daily Note     Today's date: 2025  Patient name: Christel Murray  : 1995  MRN: [id number]  Referring provider: Geetha Ramsey CRNP  Dx:   Encounter Diagnosis     ICD-10-CM    1. Right wrist pain  M25.531                      Subjective: Pt reports she has been on light duty for [employer] and wearing her wrist brace consistently which has been helping. She notes that she does have wrist pain when pushing down with hand when writing at her per [employer] where she is also on light duty. Pt advised to discuss this with Occumed to determine if additional restrictions can be provided.     Objective: See treatment diary below. Pt issued updated HEP to which she demonstrated and verbalized understanding.    Assessment: Pt with good response to addition of manual techniques as listed with improved mobility with completion. Pt with good tolerance to progression of program with addition of wrist flexor and extensor stretching and wrist isometrics. Will continue to progress program as able. Pt will benefit from continued skilled PT intervention in order to address remaining limitations and to restore maximal function.     Plan: Continue per plan of care.  Progress treatment as tolerated.       Precautions:   Patient Active Problem List   Diagnosis    Allergic rhinitis    Asthma    Attention deficit hyperactivity disorder (ADHD), predominantly inattentive type    Eczema    Prediabetes    MDD (major depressive disorder), recurrent episode, moderate (HCC)    Seasonal allergies    Anxiety    Abnormal wellness exam    Hyperglycemia    Class 1 obesity due to excess calories without serious comorbidity with body mass index (BMI) of 34.0 to 34.9 in adult    Shingles    IUD (intrauterine device) in place    Migraine without status migrainosus, not intractable    Gastroesophageal reflux disease without esophagitis    Scalp irritation           Manuals            R Wrist Flexor/Extensor Stretching  MD EPPERSON Distal RU  "Mobs: Distraction  MD                                     Neuro Re-Ed                                                                                                          Ther Ex             Wrist flexion/extension 2#  30            Finger flexion web Red  45            Flex bar  Twist  45            TB row Blue  30            Wrist Flexor Stretch  15\"x3           Wrist Extensor Stretch  15\"x3           Wrist Isometrics: Flex/Ext/RD  5\"x10 ea                        Ther Activity                                       Gait Training                                       Modalities             MHP pre 10' R wrist 5'           CP Post  5'             QR Code:    [employer] HEP:  Access Code: [email or web address]  URL: [email or web address]  Date: 01/09/2025  Prepared by: Yolanda Delaney    Exercises  - Standing Wrist Flexion Stretch  - 1 x daily - 3 reps - 15 hold  - Standing Wrist Extension Stretch  - 1 x daily - 3 reps - 15 hold  - Seated Isometric Wrist Flexion Neutral  - 1 x daily - 1 sets - 10 reps - 5 hold  - Isometric Wrist Extension Neutral  - 1 x daily - 1 sets - 10 reps - 5 hold  - Seated Isometric Wrist Radial Deviation with Manual Resistance  - 1 x daily - 1 sets - 10 reps - 5 hold    Patient Education  - Ice     "

## 2025-01-13 ENCOUNTER — OFFICE VISIT (OUTPATIENT)
Dept: PHYSICAL THERAPY | Facility: REHABILITATION | Age: 30
End: 2025-01-13
Payer: OTHER MISCELLANEOUS

## 2025-01-13 DIAGNOSIS — M25.531 RIGHT WRIST PAIN: Primary | ICD-10-CM

## 2025-01-13 PROCEDURE — 97112 NEUROMUSCULAR REEDUCATION: CPT | Performed by: PHYSICAL THERAPIST

## 2025-01-13 PROCEDURE — 97110 THERAPEUTIC EXERCISES: CPT | Performed by: PHYSICAL THERAPIST

## 2025-01-13 PROCEDURE — 97010 HOT OR COLD PACKS THERAPY: CPT | Performed by: PHYSICAL THERAPIST

## 2025-01-13 PROCEDURE — 97140 MANUAL THERAPY 1/> REGIONS: CPT | Performed by: PHYSICAL THERAPIST

## 2025-01-13 NOTE — PROGRESS NOTES
Daily Note     Today's date: 2025  Patient name: Christel Murray  : 1995  MRN: [id number]  Referring provider: Geetha Ramsey CRNP  Dx:   Encounter Diagnosis     ICD-10-CM    1. Right wrist pain  M25.531                      Subjective: Pt reports she spoke with her supervisor/co-workers when working at [facility] [employer] this weekend and they assisted with rounds so she didn't have to write as frequently or as much which helped to reduce her pain. She reports she continues to wear her wrist brace with driving and working and this provides some relief and support.     Objective: See treatment diary below.    Assessment: Pt with good response to manual techniques as listed with improved mobility with completion. Pt with good tolerance to progression of program with addition of wrist UD isometrics and digiflex gripping with appropriate challenge and fatigue, no c/o pain or adverse response.  Will continue to progress program as able. Pt will benefit from continued skilled PT intervention in order to address remaining limitations and to restore maximal function.     Plan: Continue per plan of care.  Progress treatment as tolerated.       Precautions:   Patient Active Problem List   Diagnosis    Allergic rhinitis    Asthma    Attention deficit hyperactivity disorder (ADHD), predominantly inattentive type    Eczema    Prediabetes    MDD (major depressive disorder), recurrent episode, moderate (HCC)    Seasonal allergies    Anxiety    Abnormal wellness exam    Hyperglycemia    Class 1 obesity due to excess calories without serious comorbidity with body mass index (BMI) of 34.0 to 34.9 in adult    Shingles    IUD (intrauterine device) in place    Migraine without status migrainosus, not intractable    Gastroesophageal reflux disease without esophagitis    Scalp irritation           Manuals           R Wrist Flexor/Extensor Stretching  MD BRADLEY          R Distal RU Mobs: Distraction  MD BRADLEY                            "         Neuro Re-Ed                                                                                                          Ther Ex             Wrist flexion/extension 2#  30            Finger flexion web Red  45            Flex bar  Twist  45            TB row Blue  30            Wrist Flexor Stretch  15\"x3           Wrist Extensor Stretch  15\"x3           Wrist Isometrics: Flex/Ext/RD  5\"x10 ea +UD  5\"x10 ea            Digiflex gripping   Blue  5\"x10          Ther Activity                                       Gait Training                                       Modalities             MHP pre 10' R wrist 10 5'          CP Post   5'            QR Code:    [employer]:  Access Code: [email or web address]  URL: [email or web address]  Date: 01/09/2025  Prepared by: Yolanda Delaney    Exercises  - Standing Wrist Flexion Stretch  - 1 x daily - 3 reps - 15 hold  - Standing Wrist Extension Stretch  - 1 x daily - 3 reps - 15 hold  - Seated Isometric Wrist Flexion Neutral  - 1 x daily - 1 sets - 10 reps - 5 hold  - Isometric Wrist Extension Neutral  - 1 x daily - 1 sets - 10 reps - 5 hold  - Seated Isometric Wrist Radial Deviation with Manual Resistance  - 1 x daily - 1 sets - 10 reps - 5 hold    Patient Education  - Ice     "

## 2025-01-15 ENCOUNTER — OFFICE VISIT (OUTPATIENT)
Dept: PHYSICAL THERAPY | Facility: REHABILITATION | Age: 30
End: 2025-01-15
Payer: OTHER MISCELLANEOUS

## 2025-01-15 DIAGNOSIS — M25.531 RIGHT WRIST PAIN: Primary | ICD-10-CM

## 2025-01-15 PROCEDURE — 97112 NEUROMUSCULAR REEDUCATION: CPT

## 2025-01-15 PROCEDURE — 97110 THERAPEUTIC EXERCISES: CPT

## 2025-01-15 PROCEDURE — 97140 MANUAL THERAPY 1/> REGIONS: CPT

## 2025-01-15 NOTE — PROGRESS NOTES
Daily Note     Today's date: 1/15/2025  Patient name: Christel Murrya  : 1995  MRN: [id number]  Referring provider: Geetha Ramsey CRNP  Dx:   Encounter Diagnosis     ICD-10-CM    1. Right wrist pain  M25.531                      Subjective: Pt comes to therapy reporting no changes since lv. She denies adverse response to lv. She reports she feels about 95% and has an occumed f/u tomorrow. She requests truncated session so she can get back to work.    Objective: See treatment diary below.    Assessment: Pt with good tolerance to treatment. Favorable response to manual techniques with improved mobility afterward. Pt able to complete exercises without adverse response. Will continue to progress as able. Pt would benefit from continued skilled PT to improve current deficits and maximize function.    Plan: Continue per plan of care.  Progress treatment as tolerated.       Precautions:   Patient Active Problem List   Diagnosis    Allergic rhinitis    Asthma    Attention deficit hyperactivity disorder (ADHD), predominantly inattentive type    Eczema    Prediabetes    MDD (major depressive disorder), recurrent episode, moderate (HCC)    Seasonal allergies    Anxiety    Abnormal wellness exam    Hyperglycemia    Class 1 obesity due to excess calories without serious comorbidity with body mass index (BMI) of 34.0 to 34.9 in adult    Shingles    IUD (intrauterine device) in place    Migraine without status migrainosus, not intractable    Gastroesophageal reflux disease without esophagitis    Scalp irritation           Manuals 12/30 1/8 1/13 1/15         R Wrist Flexor/Extensor Stretching  MD MD MENARD         R Distal RU Mobs: Distraction  MD BRADLEY SE                                   Neuro Re-Ed                                                                                                          Ther Ex             Wrist flexion/extension 2#  30            Finger flexion web Red  45            Flex bar  Twist  45           "  TB row Blue  30            Wrist Flexor Stretch  15\"x3           Wrist Extensor Stretch  15\"x3           Wrist Isometrics: Flex/Ext/RD  5\"x10 ea +UD  5\"x10 ea   5\"x10  All directions         Digiflex gripping   Blue  5\"x10 Blue  5\"x20         Ther Activity                                       Gait Training                                       Modalities             MHP pre 10' R wrist 10 5' 5'         CP Post   5' declined           QR Code:    Paybubble:  Access Code: [email or web address]  URL: [email or web address]  Date: 01/09/2025  Prepared by: Yolanda Delaney    Exercises  - Standing Wrist Flexion Stretch  - 1 x daily - 3 reps - 15 hold  - Standing Wrist Extension Stretch  - 1 x daily - 3 reps - 15 hold  - Seated Isometric Wrist Flexion Neutral  - 1 x daily - 1 sets - 10 reps - 5 hold  - Isometric Wrist Extension Neutral  - 1 x daily - 1 sets - 10 reps - 5 hold  - Seated Isometric Wrist Radial Deviation with Manual Resistance  - 1 x daily - 1 sets - 10 reps - 5 hold    Patient Education  - Ice     "

## 2025-01-16 ENCOUNTER — APPOINTMENT (OUTPATIENT)
Dept: URGENT CARE | Facility: MEDICAL CENTER | Age: 30
End: 2025-01-16
Payer: OTHER MISCELLANEOUS

## 2025-01-16 PROCEDURE — 99213 OFFICE O/P EST LOW 20 MIN: CPT | Performed by: NURSE PRACTITIONER

## 2025-01-22 ENCOUNTER — OFFICE VISIT (OUTPATIENT)
Dept: FAMILY MEDICINE CLINIC | Facility: CLINIC | Age: 30
End: 2025-01-22
Payer: COMMERCIAL

## 2025-01-22 VITALS
SYSTOLIC BLOOD PRESSURE: 134 MMHG | BODY MASS INDEX: 33.42 KG/M2 | TEMPERATURE: 98 F | WEIGHT: 188.6 LBS | HEIGHT: 63 IN | RESPIRATION RATE: 16 BRPM | DIASTOLIC BLOOD PRESSURE: 86 MMHG | OXYGEN SATURATION: 99 % | HEART RATE: 57 BPM

## 2025-01-22 DIAGNOSIS — E66.9 OBESITY (BMI 30-39.9): ICD-10-CM

## 2025-01-22 DIAGNOSIS — R11.2 NAUSEA AND VOMITING, UNSPECIFIED VOMITING TYPE: ICD-10-CM

## 2025-01-22 DIAGNOSIS — F90.0 ATTENTION DEFICIT HYPERACTIVITY DISORDER (ADHD), PREDOMINANTLY INATTENTIVE TYPE: ICD-10-CM

## 2025-01-22 DIAGNOSIS — K21.9 GASTROESOPHAGEAL REFLUX DISEASE WITHOUT ESOPHAGITIS: ICD-10-CM

## 2025-01-22 DIAGNOSIS — L30.9 ECZEMA, UNSPECIFIED TYPE: ICD-10-CM

## 2025-01-22 DIAGNOSIS — J30.2 SEASONAL ALLERGIES: ICD-10-CM

## 2025-01-22 DIAGNOSIS — F98.8 ATTENTION DEFICIT DISORDER, UNSPECIFIED TYPE: Primary | ICD-10-CM

## 2025-01-22 PROCEDURE — 99214 OFFICE O/P EST MOD 30 MIN: CPT | Performed by: FAMILY MEDICINE

## 2025-01-22 RX ORDER — TIRZEPATIDE 5 MG/.5ML
5 INJECTION, SOLUTION SUBCUTANEOUS WEEKLY
Qty: 2 ML | Refills: 0 | Status: SHIPPED | OUTPATIENT
Start: 2025-01-22

## 2025-01-22 RX ORDER — DEXTROAMPHETAMINE SACCHARATE, AMPHETAMINE ASPARTATE, DEXTROAMPHETAMINE SULFATE AND AMPHETAMINE SULFATE 5; 5; 5; 5 MG/1; MG/1; MG/1; MG/1
TABLET ORAL
Qty: 30 TABLET | Refills: 0 | Status: SHIPPED | OUTPATIENT
Start: 2025-01-22

## 2025-01-22 RX ORDER — DEXTROAMPHETAMINE SACCHARATE, AMPHETAMINE ASPARTATE MONOHYDRATE, DEXTROAMPHETAMINE SULFATE AND AMPHETAMINE SULFATE 5; 5; 5; 5 MG/1; MG/1; MG/1; MG/1
20 CAPSULE, EXTENDED RELEASE ORAL EVERY MORNING
Qty: 30 CAPSULE | Refills: 0 | Status: SHIPPED | OUTPATIENT
Start: 2025-01-22

## 2025-01-22 RX ORDER — PANTOPRAZOLE SODIUM 40 MG/1
40 TABLET, DELAYED RELEASE ORAL
Qty: 100 TABLET | Refills: 1 | Status: SHIPPED | OUTPATIENT
Start: 2025-01-22

## 2025-01-22 NOTE — ASSESSMENT & PLAN NOTE
Isaac from Wegovy 2.4 mg injections to Zepbound 5 mg injections due to side effects  Follow up in one month  Orders:  •  tirzepatide (Zepbound) 5 mg/0.5 mL auto-injector; Inject 0.5 mL (5 mg total) under the skin once a week

## 2025-01-22 NOTE — ASSESSMENT & PLAN NOTE
Well controlled  Refill Adderall 20 mg tablets and adderall xr 20 mg tablets  Orders:  •  amphetamine-dextroamphetamine (ADDERALL XR) 20 MG 24 hr capsule; Take 1 capsule (20 mg total) by mouth every morning Max Daily Amount: 20 mg  •  amphetamine-dextroamphetamine (ADDERALL, 20MG,) 20 mg tablet; One tab in the afternoon

## 2025-01-22 NOTE — PROGRESS NOTES
Name: Christel Murray      : 1995      MRN: [id number]  Encounter Provider: Kaity Echevarria MD  Encounter Date: 2025   Encounter department: [facility]    Assessment & Plan  Attention deficit disorder, unspecified type  Well controlled  Refill Adderall 20 mg tablets and adderall xr 20 mg tablets       Obesity (BMI 30-39.9)  Swtich from Wegovy 2.4 mg injections to Zepbound 5 mg injections due to side effects  Follow up in one month  Orders:  •  tirzepatide (Zepbound) 5 mg/0.5 mL auto-injector; Inject 0.5 mL (5 mg total) under the skin once a week    Nausea and vomiting, unspecified vomiting type  Side effect of Wegovy. Switch to Zepbound  Refill zofran 4 mg tablets to be used PRN nausea       Seasonal allergies  Resume cetrizine (zyrtec) 10 mg tablets in one month         Gastroesophageal reflux disease without esophagitis    Orders:  •  pantoprazole (PROTONIX) 40 mg tablet; Take 1 tablet (40 mg total) by mouth daily before breakfast    Eczema, unspecified type  Patient reports well controlled  Refill clabetasol 0.05% external solution       Attention deficit hyperactivity disorder (ADHD), predominantly inattentive type  Well controlled  Refill Adderall 20 mg tablets and adderall xr 20 mg tablets  Orders:  •  amphetamine-dextroamphetamine (ADDERALL XR) 20 MG 24 hr capsule; Take 1 capsule (20 mg total) by mouth every morning Max Daily Amount: 20 mg  •  amphetamine-dextroamphetamine (ADDERALL, 20MG,) 20 mg tablet; One tab in the afternoon      Depression Screening and Follow-up Plan: Patient's depression screening was positive with a PHQ-9 score of 5.   Patient with underlying depression and was advised to continue current medications as prescribed.       History of Present Illness     Christel murray is a 29 year old female with pmh of adhd, obesity, GERD, and seasonal allergies presenting to the pcp to discuss multiple medications. She takes the adderall and adderall XR for adhd. She  reports she has been taking these for about 7 years. She reports these medications still work well for her. She denies any side effects from medication. She needs these medications refilled.     She is also currently taking Wegovy for weight loss. She reports she is taking the 2.4 mg injections. She is taking this medication once weekly. She does report the medication makes her nauseous. She reports the nausea lasts 1-2 days after the injection. She reports the nausea is well controlled with zofran. She reports occasional episodes of emesis. She also reports acid reflux as a side effect of this medication. She reports her acid reflux is well controlled on protonix. She reports she was taking the medication less frequently over the holidays to avoid the side effects. She reports that she is happy with her current progress.    She reports she takes zyrtec and and flonase for most of the year. She reports her symptoms are worst in April and may. She wants to know when to begin her medications. She reports zyrtec typically helps her symptoms. She reports a history of severe seasonal allergies. She requests having prednisone to be able to use as needed for allergies. She does not currently have an allergist.    She has a history of eczema on her scalp. She reports trying ketokonazole which did not help. She reports she still has the eczema on her scalp. Well controlled per patient. She reports relief with clabetasol. She reports eczema in other areas. She does not feel like she needs to see a dermatolgist. She would like the clabetasol solution to be refilled.     Anxiety  Symptoms include nausea. Patient reports no chest pain, dizziness or shortness of breath.         Review of Systems   Constitutional:  Negative for fatigue and fever.   Respiratory:  Negative for shortness of breath.    Cardiovascular:  Negative for chest pain.   Gastrointestinal:  Positive for nausea and vomiting. Negative for abdominal pain,  constipation and diarrhea.   Neurological:  Negative for dizziness and syncope.     History reviewed. No pertinent past medical history.  Past Surgical History:   Procedure Laterality Date   • TONSILLECTOMY     • WISDOM TOOTH EXTRACTION       Family History   Problem Relation Age of Onset   • ADD / ADHD Mother    • Spina bifida Mother    • Diabetes Father    • Stroke Father    • Polycythemia Father      Social History     Tobacco Use   • Smoking status: Never   • Smokeless tobacco: Never   Vaping Use   • Vaping status: Never Used   Substance and Sexual Activity   • Alcohol use: Never   • Drug use: Never   • Sexual activity: Yes     Birth control/protection: I.U.D.     Current Outpatient Medications on File Prior to Visit   Medication Sig   • Albuterol Sulfate 108 (90 Base) MCG/ACT AEPB    • cetirizine (ZyrTEC) 10 mg tablet Take 1 tablet (10 mg total) by mouth daily   • cholecalciferol (VITAMIN D3) 400 units tablet Take by mouth daily   • clobetasol (TEMOVATE) 0.05 % external solution Apply topically 2 (two) times a day   • EpiPen 2-Liang 0.3 MG/0.3ML SOAJ Inject 0.3 mg into a muscle once   • fluticasone (Flonase Allergy Relief) 50 mcg/act nasal spray 2 sprays into each nostril daily   • hydrOXYzine HCL (ATARAX) 10 mg tablet Take 1 tablet (10 mg total) by mouth every 6 (six) hours as needed for itching   • ketoconazole (NIZORAL) 2 % shampoo Apply 1 Application topically 2 (two) times a week   • ondansetron (ZOFRAN) 4 mg tablet Take 1 tablet (4 mg total) by mouth every 8 (eight) hours as needed for nausea or vomiting   • PARAGARD INTRAUTERINE COPPER IU    • Wegovy 2.4 MG/0.75ML INJECT 2.4 MG UNDER THE SKIN WEEKLY   • [DISCONTINUED] amphetamine-dextroamphetamine (ADDERALL XR) 20 MG 24 hr capsule Take 1 capsule (20 mg total) by mouth every morning Max Daily Amount: 20 mg   • [DISCONTINUED] amphetamine-dextroamphetamine (ADDERALL, 20MG,) 20 mg tablet One tab in the afternoon   • [DISCONTINUED] pantoprazole (PROTONIX) 40 mg  tablet TAKE 1 TABLET (40 MG TOTAL) BY MOUTH DAILY BEFORE BREAKFAST   • [DISCONTINUED] guaiFENesin-dextromethorphan (MUCINEX DM) 600-30 mg  (Patient not taking: Reported on 7/16/2024)   • [DISCONTINUED] montelukast (SINGULAIR) 10 mg tablet Take 1 tablet (10 mg total) by mouth daily at bedtime (Patient not taking: Reported on 7/16/2024)   • [DISCONTINUED] Olopatadine HCl (Pataday) 0.7 % SOLN      Allergies   Allergen Reactions   • Pollen Extract Allergic Rhinitis, Cough, Eye Swelling, Hives, Itching and Sneezing   • Cephalosporins Rash     Immunization History   Administered Date(s) Administered   • COVID-19 PFIZER VACCINE 0.3 ML IM 12/21/2020, 01/14/2021, 12/20/2021   • DTP 1995, [birth year], 10/25/1995, 06/15/1999   • DTaP 1995, 1995, 1994, 10/25/1995, 06/15/1999   • Fluzone Split Quad 0.25 mL 11/06/1998, 10/19/1999, 11/18/2002, 10/20/2003   • H1N1, All Formulations 10/27/2009   • HPV Quadrivalent 08/29/2007, 10/30/2007, 03/10/2008   • HPV9 08/29/2007, 10/30/2007, 03/10/2008   • Hep A, ped/adol, 2 dose 08/29/2007, 03/10/2008   • Hep B, Adolescent or Pediatric 1995, 1995, 1995   • Hep B, adult 1995, 1994, 1994   • Hepatitis A 08/29/2007, 03/10/2008   • HiB 1995, 1995, 04/07/1995   • INFLUENZA 11/06/1998, 10/19/1999, 11/18/2002, 10/20/2003, 10/18/2004, 10/18/2004, 10/25/2005, 10/25/2005, 11/06/2006, 10/30/2007, 10/01/2012, 10/28/2014, 12/21/2015, 12/21/2015, 09/21/2016, 09/21/2016, 10/17/2019, 10/17/2019, 10/30/2020, 10/30/2020, 11/12/2022, 10/31/2023, 10/31/2023   • IPV 1995, 1995, 10/25/1995, 06/15/1999   • MMR 06/21/1995, 04/16/1998   • Meningococcal MCV4, Unspecified 07/29/2005, 06/11/2012   • OPV 1995   • Pneumococcal Conjugate 13-Valent 05/30/2000   • Pneumococcal Conjugate PCV 7 05/30/2000   • Pneumococcal Polysaccharide PPV23 11/18/2019   • Tdap 02/16/2010, 07/28/2022   • Tuberculin Skin Test-PPD Intradermal  "01/15/1995, 09/21/2016, 08/21/2017   • Varicella 11/27/1995   • Zoster 12/15/2016     Objective   /86 (BP Location: Left arm, Patient Position: Sitting, Cuff Size: Large)   Pulse 57   Temp 98 °F (36.7 °C) (Tympanic)   Resp 16   Ht 5' 3\" (1.6 m)   Wt 85.5 kg (188 lb 9.6 oz)   SpO2 99%   BMI 33.41 kg/m²     Physical Exam  Vitals and nursing note reviewed.   Constitutional:       General: She is not in acute distress.     Appearance: She is well-developed.   HENT:      Head: Normocephalic and atraumatic.   Eyes:      Pupils: Pupils are equal, round, and reactive to light.   Cardiovascular:      Rate and Rhythm: Normal rate and regular rhythm.      Heart sounds: Normal heart sounds. No murmur heard.     No friction rub. No gallop.   Pulmonary:      Effort: Pulmonary effort is normal. No respiratory distress.      Breath sounds: Normal breath sounds. No wheezing, rhonchi or rales.   Abdominal:      General: Bowel sounds are normal.      Palpations: Abdomen is soft.   Musculoskeletal:      Cervical back: Normal range of motion and neck supple.   Lymphadenopathy:      Cervical: No cervical adenopathy.   Skin:     General: Skin is warm and dry.   Neurological:      General: No focal deficit present.      Mental Status: She is alert and oriented to person, place, and time.   Psychiatric:         Mood and Affect: Mood normal.         Behavior: Behavior normal.     Depression Screening Follow-up Plan: Patient's depression screening was positive with a PHQ-9 score of 5. Patient with underlying depression and was advised to continue current medications as prescribed.    "

## 2025-01-22 NOTE — ASSESSMENT & PLAN NOTE
Orders:  •  pantoprazole (PROTONIX) 40 mg tablet; Take 1 tablet (40 mg total) by mouth daily before breakfast

## 2025-01-29 DIAGNOSIS — J30.89 ALLERGIC RHINITIS DUE TO OTHER ALLERGIC TRIGGER, UNSPECIFIED SEASONALITY: ICD-10-CM

## 2025-01-29 RX ORDER — FLUTICASONE PROPIONATE 50 MCG
2 SPRAY, SUSPENSION (ML) NASAL DAILY
Qty: 16 G | Refills: 2 | Status: SHIPPED | OUTPATIENT
Start: 2025-01-29

## 2025-02-17 DIAGNOSIS — R23.8 SCALP IRRITATION: ICD-10-CM

## 2025-02-19 ENCOUNTER — TELEMEDICINE (OUTPATIENT)
Dept: FAMILY MEDICINE CLINIC | Facility: CLINIC | Age: 30
End: 2025-02-19
Payer: COMMERCIAL

## 2025-02-19 VITALS — BODY MASS INDEX: 33.47 KG/M2 | WEIGHT: 188.9 LBS | HEIGHT: 63 IN

## 2025-02-19 DIAGNOSIS — J01.00 ACUTE NON-RECURRENT MAXILLARY SINUSITIS: Primary | ICD-10-CM

## 2025-02-19 DIAGNOSIS — E66.9 OBESITY (BMI 30-39.9): ICD-10-CM

## 2025-02-19 PROCEDURE — 99213 OFFICE O/P EST LOW 20 MIN: CPT | Performed by: FAMILY MEDICINE

## 2025-02-19 RX ORDER — PREDNISONE 50 MG/1
50 TABLET ORAL DAILY
Qty: 5 TABLET | Refills: 0 | Status: SHIPPED | OUTPATIENT
Start: 2025-02-19 | End: 2025-02-24

## 2025-02-19 RX ORDER — TIRZEPATIDE 5 MG/.5ML
5 INJECTION, SOLUTION SUBCUTANEOUS WEEKLY
Qty: 2 ML | Refills: 0 | Status: SHIPPED | OUTPATIENT
Start: 2025-02-19

## 2025-02-19 RX ORDER — CLOBETASOL PROPIONATE 0.5 MG/ML
SOLUTION TOPICAL 2 TIMES DAILY
Qty: 50 ML | Refills: 1 | Status: SHIPPED | OUTPATIENT
Start: 2025-02-19

## 2025-02-19 NOTE — PROGRESS NOTES
"Virtual Regular VisitName: Christel Murray      : 1995      MRN: [id number]  Encounter Provider: Kaity Echevarria MD  Encounter Date: 2025   Encounter department: [facility]  :  Assessment & Plan  Acute non-recurrent maxillary sinusitis  She was given prescription for prednisone.  Discussed with possible side effect.  Increase oral hydration and use a humidifier.  Take Tylenol if needed.  She was given excuse to stay off work for the next 3 days.  Orders:  •  predniSONE 50 mg tablet; Take 1 tablet (50 mg total) by mouth daily for 5 days    Obesity (BMI 30-39.9)  Switch from Wegovy 2.4 to Zepbound 5 mg weekly.  Discussed with patient about low-carb diet and regular exercise.  Discussed all possible side effects.  Orders:  •  tirzepatide (Zepbound) 5 mg/0.5 mL auto-injector; Inject 0.5 mL (5 mg total) under the skin once a week        History of Present Illness     This is a virtual visit for 29-year-old female with complaint of upper respiratory symptoms for the last 3 days with been getting worse.  Has been having increased nasal congestion with sinus pressure.  Coughing with wheezing due to her history of asthma.  Denies any more fever or chills.    Cough  Associated symptoms include postnasal drip and rhinorrhea. Pertinent negatives include no chills, fever or rash.     Review of Systems   Constitutional:  Negative for activity change, chills and fever.   HENT:  Positive for congestion, postnasal drip, rhinorrhea and sinus pressure.    Respiratory:  Positive for cough. Negative for apnea.    Gastrointestinal:  Negative for diarrhea and vomiting.   Skin:  Negative for rash.   Neurological:  Negative for dizziness.       Objective   Ht 5' 3\" (1.6 m)   Wt 85.7 kg (188 lb 14.4 oz)   BMI 33.46 kg/m²     Physical Exam  Vitals reviewed.   Constitutional:       Appearance: Normal appearance.   HENT:      Head: Normocephalic and atraumatic.      Mouth/Throat:      Pharynx: Oropharynx " is clear. Posterior oropharyngeal erythema present.   Eyes:      Conjunctiva/sclera: Conjunctivae normal.   Pulmonary:      Effort: No respiratory distress.   Musculoskeletal:         General: Normal range of motion.   Skin:     Findings: No rash.   Neurological:      Mental Status: She is alert. Mental status is at baseline.   Psychiatric:         Mood and Affect: Mood normal.         Administrative Statements   Encounter provider Kaity Echevarria MD    The Patient is located at Home and in the following state in which I hold an active license [place].    The patient was identified by name and date of birth. Christel Murray was informed that this is a telemedicine visit and that the visit is being conducted through the Epic Embedded platform. She agrees to proceed..  My office door was closed. No one else was in the room.  She acknowledged consent and understanding of privacy and security of the video platform. The patient has agreed to participate and understands they can discontinue the visit at any time.    I have spent a total time of 20 minutes in caring for this patient on the day of the visit/encounter including Prognosis, Risks and benefits of tx options, Instructions for management, and Patient and family education.

## 2025-02-19 NOTE — ASSESSMENT & PLAN NOTE
Switch from Wegovy 2.4 to Zepbound 5 mg weekly.  Discussed with patient about low-carb diet and regular exercise.  Discussed all possible side effects.  Orders:  •  tirzepatide (Zepbound) 5 mg/0.5 mL auto-injector; Inject 0.5 mL (5 mg total) under the skin once a week

## 2025-02-19 NOTE — ASSESSMENT & PLAN NOTE
She was given prescription for prednisone.  Discussed with possible side effect.  Increase oral hydration and use a humidifier.  Take Tylenol if needed.  She was given excuse to stay off work for the next 3 days.  Orders:  •  predniSONE 50 mg tablet; Take 1 tablet (50 mg total) by mouth daily for 5 days

## 2025-02-19 NOTE — LETTER
February 19, 2025     Patient: Christel uMrray  YOB: 1995  Date of Visit: 2/19/2025      To Whom it May Concern:    Christel Murray is under my professional care. Christel was seen in virtual visit on 2/19/2025. Christel is medically excused from work between 2/19/2025 until 2/22/2025.  may return to work on 2/24/2025 .    If you have any questions or concerns, please don't hesitate to call.         Sincerely,          Kaity Echevarria MD        CC: Christel Cedeñor

## 2025-02-24 ENCOUNTER — PATIENT MESSAGE (OUTPATIENT)
Dept: FAMILY MEDICINE CLINIC | Facility: CLINIC | Age: 30
End: 2025-02-24

## 2025-02-25 ENCOUNTER — PATIENT MESSAGE (OUTPATIENT)
Dept: FAMILY MEDICINE CLINIC | Facility: CLINIC | Age: 30
End: 2025-02-25

## 2025-02-25 ENCOUNTER — TELEPHONE (OUTPATIENT)
Dept: FAMILY MEDICINE CLINIC | Facility: CLINIC | Age: 30
End: 2025-02-25

## 2025-02-25 NOTE — TELEPHONE ENCOUNTER
Pt was switched from Wegovy to zepbound 5 mg on 2/19/25. Can someone please check if this needs a prior auth and if so please start it.

## 2025-02-27 DIAGNOSIS — J01.00 ACUTE NON-RECURRENT MAXILLARY SINUSITIS: Primary | ICD-10-CM

## 2025-02-27 NOTE — TELEPHONE ENCOUNTER
Called pharmacy  [facility] [id number]  [facility]  [address]  ID [id number]  NAME OF INSURANCE [employer]

## 2025-02-27 NOTE — TELEPHONE ENCOUNTER
PA for Zepbound 5MGSUBMITTED to [facility]    via    [x]CMM-KEY: [id number]    [x]PA sent as URGENT    All office notes, labs and other pertaining documents and studies sent. Clinical questions answered. Awaiting determination from insurance company.     Turnaround time for your insurance to make a decision on your Prior Authorization can take 7-21 business days.

## 2025-03-03 NOTE — TELEPHONE ENCOUNTER
PA for Zepbound 5MG  APPROVED     Date(s) approved 02/27/25-02/27/26      Patient advised by          [x]Nerd Kingdomhart Message  []Phone call   []LMOM  []L/M to call office as no active Communication consent on file  []Unable to leave detailed message as VM not approved on Communication consent       Pharmacy advised by    [x]Fax  []Phone call       Approval letter scanned into Media Yes

## 2025-03-11 NOTE — PATIENT COMMUNICATION
Pt picked up zepbound and due to cost she would like to go back to wegovy.  She was switched on 1/29/25 due to side effects of wegovy.  Please advise    Pt will contact us closer to the end of the month as she is going to try and use discount card.

## 2025-03-21 PROBLEM — J01.00 ACUTE NON-RECURRENT MAXILLARY SINUSITIS: Status: RESOLVED | Noted: 2025-02-19 | Resolved: 2025-03-21

## 2025-04-02 DIAGNOSIS — F90.0 ATTENTION DEFICIT HYPERACTIVITY DISORDER (ADHD), PREDOMINANTLY INATTENTIVE TYPE: ICD-10-CM

## 2025-04-02 DIAGNOSIS — J30.89 ALLERGIC RHINITIS DUE TO OTHER ALLERGIC TRIGGER, UNSPECIFIED SEASONALITY: ICD-10-CM

## 2025-04-02 DIAGNOSIS — E66.9 OBESITY (BMI 30-39.9): ICD-10-CM

## 2025-04-02 RX ORDER — TIRZEPATIDE 5 MG/.5ML
5 INJECTION, SOLUTION SUBCUTANEOUS WEEKLY
Qty: 2 ML | Refills: 0 | Status: SHIPPED | OUTPATIENT
Start: 2025-04-02

## 2025-04-02 RX ORDER — DEXTROAMPHETAMINE SACCHARATE, AMPHETAMINE ASPARTATE MONOHYDRATE, DEXTROAMPHETAMINE SULFATE AND AMPHETAMINE SULFATE 5; 5; 5; 5 MG/1; MG/1; MG/1; MG/1
20 CAPSULE, EXTENDED RELEASE ORAL EVERY MORNING
Qty: 30 CAPSULE | Refills: 0 | Status: SHIPPED | OUTPATIENT
Start: 2025-04-02

## 2025-04-02 RX ORDER — DEXTROAMPHETAMINE SACCHARATE, AMPHETAMINE ASPARTATE, DEXTROAMPHETAMINE SULFATE AND AMPHETAMINE SULFATE 5; 5; 5; 5 MG/1; MG/1; MG/1; MG/1
TABLET ORAL
Qty: 30 TABLET | Refills: 0 | Status: SHIPPED | OUTPATIENT
Start: 2025-04-02

## 2025-04-02 RX ORDER — FLUTICASONE PROPIONATE 50 MCG
2 SPRAY, SUSPENSION (ML) NASAL DAILY
Qty: 16 G | Refills: 0 | Status: SHIPPED | OUTPATIENT
Start: 2025-04-02

## 2025-04-02 NOTE — TELEPHONE ENCOUNTER
Pt called in requesting for the following 4 med refills she is rescheduled to be seen on 04/15 due to her work schedule. Kindly help with there refills and call in the prescription to  pharmacy. Thanks

## 2025-04-02 NOTE — TELEPHONE ENCOUNTER
Pt was last seen 25 and was to return in 1 month. She was scheduled for 4/3 and rescheduled until 4/15/25.I copied pasted refills into chart from [facility] web site and sent refill to provider for approval      Patient Prescription Report    Patients      Select Patient Id Name  [facility]   [] 1 BRENNON PITTSR 1995 F [address] [place], [place] [id number] [place]           Search Criteria    Name Date of Birth Date Range   brennon pittsr 1995 To 2025     Requester Name Requested Date   KAREN ESPINOZASSM [facility] 2025 13:50:52 ([facility])     Summary  Prescriptions  10  Prescribers  1  Pharmacies  1  View Map  Drug Classes  Benzodiazepines  0  Stimulants  10  Opioids  0  Muscle Relaxants  0  Opioid Dosage  Total MME for Active Prescriptions  0    Average MME  0.00  MME Graph   MME Calculator     Prescriptions  Notifications    Prescribers  Pharmacies  MME Graph    [] [place] Drug Categories:     [] Stimulants      Showentries  Search:  Patient Id Prescription # Filled Written Drug Label Qty Days Strength MME** Prescriber Pharmacy Payment REFILL #/Auth State Detail   1 [id number] 2024 Mixed Amphetamine Salts (Capsule, Extended Release) 30.0 30 20 MG NA GEORGETTE) [facility] [place] 0 / 0 [place]    1 [id number] 2024 Amphetamine Salt Combo (Tablet) 30.0 30 20 MG NA WASPATITOM(MD) [facility] [place] 0 / 0 [place]    1 [id number] 10/02/2024 10/01/2024 Amphetamine Salt Combo (Tablet) 30.0 30 20 MG NA WASSIM(MD) [facility] [employer] [place] 0 / 0 [place]    1 [id number] 10/02/2024 10/01/2024 Mixed Amphetamine Salts (Capsule, Extended Release) 30.0 30 20 MG NA WASSIM(MD) [facility] [employer] [place] 0 / 0 [place]    1 [id number] 2024 Amphetamine Salt Combo (Tablet) 30.0 30 20 MG NA WASSIM(MD) [facility] [employer] [place] / 0  [place]    1 [id number] 07/16/2024 07/16/2024 Mixed Amphetamine Salts (Capsule, Extended Release) 30.0 30 20 MG NA WASSIM(MD) [facility] Commercial [place] 0 / 0 [place]    1 [id number] 06/05/2024 06/05/2024 Amphetamine Salt Combo (Tablet) 30.0 30 20 MG NA WASSIM(MD) [facility] [employer] [place] 0 / 0 [place]    1 [id number] 06/05/2024 06/05/2024 Mixed Amphetamine Salts (Capsule, Extended Release) 30.0 30 20 MG NA WASSIM(MD) [facility] [employer] [place] 0 / 0 [place]    1 [id number] 04/15/2024 04/15/2024 Amphetamine Salt Combo (Tablet) 30.0 30 20 MG NA WASSIM(MD) [facility] [employer] [place] 0 / 0 [place]    1 [id number] 04/15/2024 04/15/2024 Mixed Amphetamine Salts (Capsule, Extended Release) 30.0 30 20 MG NA WASSIM(MD) [facility] Commercial [place] 0 / 0 [place]    Showing 1 to 10 of 10 entries  [id number]     * Per CDC guidance, the conversion factors and associated daily morphine milligram equivalents for drugs prescribed as part of medication-assisted treatment for opioid use disorder should not be used to benchmark against dosage thresholds meant for opioids prescribed for pain.  Report Disclaimer:  Information from the [place] Prescription Drug Monitoring Program (PDMP) database is protected health information and any information accessed must be treated as confidential. Any person who knowingly or intentionally makes an unauthorized disclosure of information from the PDMP database will be subject to civil and criminal penalties as set forth in the ABC-MAP Act 191 of 2014; Act of Oct. 27, 2014, P.L. 2911, No. 191.    The information in the PDMP database is submitted by pharmacies and may contain errors and omissions. Additionally, pharmacies may submit extraneous non-controlled substance dispensations to the PDMP database; if a non-controlled substance is present on one patient’s Prescription  Report, it should not be assumed that this same medication will be reported on another patient’s Prescription Report. Independent verification of prescription information with pharmacies and prescribers may sometimes be prudent or necessary.  Educational content  CDC MME calculation guidelines  [place] Prescribing Guidelines  Letter Regarding the Misapplication of Prescribing Guidelines   Guide for Appropriate Tapering or Discontinuation of Long-Term Opioid Use

## 2025-04-15 ENCOUNTER — OFFICE VISIT (OUTPATIENT)
Dept: FAMILY MEDICINE CLINIC | Facility: CLINIC | Age: 30
End: 2025-04-15
Payer: COMMERCIAL

## 2025-04-15 VITALS
WEIGHT: 189.4 LBS | OXYGEN SATURATION: 99 % | HEART RATE: 107 BPM | SYSTOLIC BLOOD PRESSURE: 124 MMHG | BODY MASS INDEX: 33.56 KG/M2 | DIASTOLIC BLOOD PRESSURE: 84 MMHG | RESPIRATION RATE: 16 BRPM | HEIGHT: 63 IN | TEMPERATURE: 98.2 F

## 2025-04-15 DIAGNOSIS — R73.9 HYPERGLYCEMIA: ICD-10-CM

## 2025-04-15 DIAGNOSIS — K21.9 GASTROESOPHAGEAL REFLUX DISEASE WITHOUT ESOPHAGITIS: ICD-10-CM

## 2025-04-15 DIAGNOSIS — B02.9 HERPES ZOSTER WITHOUT COMPLICATION: ICD-10-CM

## 2025-04-15 DIAGNOSIS — E66.9 OBESITY (BMI 30-39.9): Primary | ICD-10-CM

## 2025-04-15 DIAGNOSIS — J30.89 ALLERGIC RHINITIS DUE TO OTHER ALLERGIC TRIGGER, UNSPECIFIED SEASONALITY: ICD-10-CM

## 2025-04-15 DIAGNOSIS — Z00.01 ABNORMAL WELLNESS EXAM: ICD-10-CM

## 2025-04-15 DIAGNOSIS — L30.9 ECZEMA, UNSPECIFIED TYPE: ICD-10-CM

## 2025-04-15 DIAGNOSIS — F90.9 ATTENTION DEFICIT HYPERACTIVITY DISORDER (ADHD), UNSPECIFIED ADHD TYPE: ICD-10-CM

## 2025-04-15 DIAGNOSIS — R11.2 NAUSEA AND VOMITING, UNSPECIFIED VOMITING TYPE: ICD-10-CM

## 2025-04-15 DIAGNOSIS — J30.2 SEASONAL ALLERGIES: ICD-10-CM

## 2025-04-15 DIAGNOSIS — J45.20 MILD INTERMITTENT ASTHMA, UNSPECIFIED WHETHER COMPLICATED: ICD-10-CM

## 2025-04-15 PROCEDURE — 96372 THER/PROPH/DIAG INJ SC/IM: CPT

## 2025-04-15 PROCEDURE — 99214 OFFICE O/P EST MOD 30 MIN: CPT | Performed by: FAMILY MEDICINE

## 2025-04-15 PROCEDURE — 99395 PREV VISIT EST AGE 18-39: CPT | Performed by: FAMILY MEDICINE

## 2025-04-15 RX ORDER — TIRZEPATIDE 7.5 MG/.5ML
7.5 INJECTION, SOLUTION SUBCUTANEOUS WEEKLY
Qty: 2 ML | Refills: 3 | Status: SHIPPED | OUTPATIENT
Start: 2025-04-15

## 2025-04-15 RX ORDER — FLUTICASONE PROPIONATE 50 MCG
2 SPRAY, SUSPENSION (ML) NASAL DAILY
Qty: 16 G | Refills: 0 | Status: SHIPPED | OUTPATIENT
Start: 2025-04-15

## 2025-04-15 RX ORDER — METHYLPREDNISOLONE ACETATE 40 MG/ML
60 INJECTION, SUSPENSION INTRA-ARTICULAR; INTRALESIONAL; INTRAMUSCULAR; SOFT TISSUE ONCE
Status: COMPLETED | OUTPATIENT
Start: 2025-04-15 | End: 2025-04-15

## 2025-04-15 RX ORDER — TIRZEPATIDE 7.5 MG/.5ML
7.5 INJECTION, SOLUTION SUBCUTANEOUS WEEKLY
Qty: 2 ML | Refills: 0 | Status: SHIPPED | OUTPATIENT
Start: 2025-04-15 | End: 2025-04-15 | Stop reason: SDUPTHER

## 2025-04-15 RX ORDER — AZELASTINE 1 MG/ML
1 SPRAY, METERED NASAL 2 TIMES DAILY
Qty: 30 ML | Refills: 3 | Status: SHIPPED | OUTPATIENT
Start: 2025-04-15

## 2025-04-15 RX ADMIN — METHYLPREDNISOLONE ACETATE 60 MG: 40 INJECTION, SUSPENSION INTRA-ARTICULAR; INTRALESIONAL; INTRAMUSCULAR; SOFT TISSUE at 08:55

## 2025-04-15 NOTE — ASSESSMENT & PLAN NOTE
- Increase to Zepbound 7.5 mg/0.5 mL injections.   - Advised patient to call after 1 month if she would like to increase dose to 10 mg injections.  Orders:  •  tirzepatide (Zepbound) 7.5 mg/0.5 mL auto-injector; Inject 0.5 mL (7.5 mg total) under the skin once a week

## 2025-04-15 NOTE — ASSESSMENT & PLAN NOTE
- Pt gets annual Shingles flare every spring. Current flare on R back is resolving.   - Advised patient to call next year prior to expected flare to start prophylactic Valtrex.

## 2025-04-15 NOTE — ASSESSMENT & PLAN NOTE
- Continue cetirizine 10 mg tablet PO daily and flonase nasal spray daily.   - Initiate Astelin nasal spray daily.     Orders:  •  azelastine (ASTELIN) 0.1 % nasal spray; 1 spray into each nostril 2 (two) times a day Use in each nostril as directed  •  methylPREDNISolone acetate (DEPO-MEDROL) injection 60 mg

## 2025-04-15 NOTE — ASSESSMENT & PLAN NOTE
- Asthma flares during allergy season.  - Patient requested refill of albuterol. Provided KANWAL/ICS combo sample instead.

## 2025-04-15 NOTE — ASSESSMENT & PLAN NOTE
It was discussed about immunizations, diet, exercise and safety measures.   - Obtain labs prior to next appt in 3 months.     Orders:  •  CBC and differential; Future  •  Comprehensive metabolic panel; Future  •  Lipid Panel with Direct LDL reflex; Future  •  Hemoglobin A1C; Future  •  TSH, 3rd generation with Free T4 reflex; Future

## 2025-04-15 NOTE — ASSESSMENT & PLAN NOTE
- Resolved after switch from Wegovy to Zepbound.   - Patient did not  pantoprazole to mg tabs.

## 2025-04-15 NOTE — PROGRESS NOTES
Name: Christel Murray      : 1995      MRN: [id number]  Encounter Provider: Kaity Echevarria MD  Encounter Date: 4/15/2025   Encounter department: [facility]  :  Assessment & Plan  Obesity (BMI 30-39.9)  - Increase to Zepbound 7.5 mg/0.5 mL injections.   - Advised patient to call after 1 month if she would like to increase dose to 10 mg injections.  Orders:  •  tirzepatide (Zepbound) 7.5 mg/0.5 mL auto-injector; Inject 0.5 mL (7.5 mg total) under the skin once a week    Nausea and vomiting, unspecified vomiting type  - Resolved after switch from Wegovy to Zepbound.       Attention deficit hyperactivity disorder (ADHD), unspecified ADHD type  - Continue adderrall XR 20 mg tablet PO in the morning.  - Continue adderrall 20 mg tablet PO in the afternooon.  - Pt declines refills at this time.        Seasonal allergies  - Continue cetirizine 10 mg tablet PO daily and flonase nasal spray daily.   - Initiate Astelin nasal spray daily.     Orders:  •  azelastine (ASTELIN) 0.1 % nasal spray; 1 spray into each nostril 2 (two) times a day Use in each nostril as directed  •  methylPREDNISolone acetate (DEPO-MEDROL) injection 60 mg    Gastroesophageal reflux disease without esophagitis  - Resolved after switch from Wegovy to Zepbound.   - Patient did not  pantoprazole to mg tabs.        Eczema, unspecified type  - Continue ketoconazole 2% shampoo BIW.   - Continue clobetasol 0.05% solution BID to scalp PRN for flares.  - Pt declines refills at this time.        Herpes zoster without complication  - Pt gets annual Shingles flare every spring. Current flare on R back is resolving.   - Advised patient to call next year prior to expected flare to start prophylactic Valtrex.        Allergic rhinitis due to other allergic trigger, unspecified seasonality  - Continue flonase nasal spray for year round allergic symptoms.   Orders:  •  fluticasone (FLONASE) 50 mcg/act nasal spray; 2 sprays into each  nostril daily  •  methylPREDNISolone acetate (DEPO-MEDROL) injection 60 mg    Hyperglycemia  - Pt currently on GLP1.   - Last A1c from 07/2024 was well controlled at 5.4%.  - Plan to recheck A1c and CMP prior to f/u in 3 months.        Abnormal wellness exam  It was discussed about immunizations, diet, exercise and safety measures.   - Obtain labs prior to next appt in 3 months.     Orders:  •  CBC and differential; Future  •  Comprehensive metabolic panel; Future  •  Lipid Panel with Direct LDL reflex; Future  •  Hemoglobin A1C; Future  •  TSH, 3rd generation with Free T4 reflex; Future    Mild intermittent asthma, unspecified whether complicated  - Asthma flares during allergy season.  - Patient requested refill of albuterol. Provided KANWAL/ICS combo sample instead.             History of Present Illness   Christel Murray is a 29 y/o female with PMH of obesity, allergies, asthma, ADHD, shingles is presenting for follow up. She is tolerating Zepbound much better than Wegovy. She denies any persisting nausea, vomiting or GERD sx. She has gained 1 lb since her last visit. No recent labs to review. She would like to discuss potentially switching back to Wegovy due to less weight loss. She also requests steroid injection for allergies. She reports shingles flare up last week on R back which is now resolving.         Review of Systems   Constitutional:  Negative for chills and fever.   HENT:  Positive for congestion. Negative for sore throat.    Eyes:  Negative for visual disturbance.   Respiratory:  Negative for cough and shortness of breath.    Cardiovascular:  Negative for chest pain, palpitations and leg swelling.   Gastrointestinal:  Negative for abdominal pain, constipation, nausea and vomiting.   Skin:  Positive for rash.   Neurological:  Negative for seizures and syncope.   All other systems reviewed and are negative.      Objective   /84 (BP Location: Left arm, Patient Position: Sitting, Cuff Size: Standard)   " Pulse (!) 107   Temp 98.2 °F (36.8 °C) (Tympanic)   Resp 16   Ht 5' 3\" (1.6 m)   Wt 85.9 kg (189 lb 6.4 oz)   SpO2 99%   BMI 33.55 kg/m²      Physical Exam  Vitals and nursing note reviewed.   Constitutional:       General: She is not in acute distress.     Appearance: She is well-developed. She is obese.   HENT:      Head: Normocephalic and atraumatic.   Eyes:      Conjunctiva/sclera: Conjunctivae normal.   Cardiovascular:      Rate and Rhythm: Normal rate and regular rhythm.      Heart sounds: No murmur heard.  Pulmonary:      Effort: Pulmonary effort is normal. No respiratory distress.      Breath sounds: Normal breath sounds.   Musculoskeletal:         General: No swelling.      Cervical back: Neck supple.   Skin:     General: Skin is warm and dry.      Findings: Rash present. Rash is crusting.             Comments: Resolving shingles rash on R back in dermatomal distribution.    Neurological:      General: No focal deficit present.      Mental Status: She is alert.   Psychiatric:         Mood and Affect: Mood normal.         "

## 2025-04-15 NOTE — ASSESSMENT & PLAN NOTE
- Continue adderrall XR 20 mg tablet PO in the morning.  - Continue adderrall 20 mg tablet PO in the afternooon.  - Pt declines refills at this time.

## 2025-04-15 NOTE — ASSESSMENT & PLAN NOTE
- Continue flonase nasal spray for year round allergic symptoms.   Orders:  •  fluticasone (FLONASE) 50 mcg/act nasal spray; 2 sprays into each nostril daily  •  methylPREDNISolone acetate (DEPO-MEDROL) injection 60 mg

## 2025-04-15 NOTE — ASSESSMENT & PLAN NOTE
- Pt currently on GLP1.   - Last A1c from 07/2024 was well controlled at 5.4%.  - Plan to recheck A1c and CMP prior to f/u in 3 months.

## 2025-04-15 NOTE — ASSESSMENT & PLAN NOTE
- Continue ketoconazole 2% shampoo BIW.   - Continue clobetasol 0.05% solution BID to scalp PRN for flares.  - Pt declines refills at this time.

## 2025-04-18 DIAGNOSIS — R11.0 NAUSEA: ICD-10-CM

## 2025-04-18 RX ORDER — ONDANSETRON 4 MG/1
4 TABLET, FILM COATED ORAL EVERY 8 HOURS PRN
Qty: 21 TABLET | Refills: 1 | Status: SHIPPED | OUTPATIENT
Start: 2025-04-18

## 2025-04-28 ENCOUNTER — OFFICE VISIT (OUTPATIENT)
Dept: URGENT CARE | Age: 30
End: 2025-04-28
Payer: COMMERCIAL

## 2025-04-28 VITALS
HEART RATE: 94 BPM | RESPIRATION RATE: 18 BRPM | WEIGHT: 180 LBS | HEIGHT: 62 IN | OXYGEN SATURATION: 99 % | BODY MASS INDEX: 33.13 KG/M2 | TEMPERATURE: 99.1 F

## 2025-04-28 DIAGNOSIS — S61.219A LACERATION WITHOUT FOREIGN BODY OF UNSPECIFIED FINGER WITHOUT DAMAGE TO NAIL, INITIAL ENCOUNTER: Primary | ICD-10-CM

## 2025-04-28 PROCEDURE — 12041 INTMD RPR N-HF/GENIT 2.5CM/<: CPT | Performed by: PHYSICIAN ASSISTANT

## 2025-04-28 PROCEDURE — 99213 OFFICE O/P EST LOW 20 MIN: CPT

## 2025-04-28 RX ORDER — CLINDAMYCIN HYDROCHLORIDE 300 MG/1
300 CAPSULE ORAL 4 TIMES DAILY
Qty: 40 CAPSULE | Refills: 0 | Status: SHIPPED | OUTPATIENT
Start: 2025-04-28 | End: 2025-05-08

## 2025-04-28 NOTE — PROGRESS NOTES
"[facility]        NAME: Christel Murray is a 30 y.o. female  : 1995    MRN: [id number]  DATE: 2025  TIME: 7:51 PM    Pulse 94   Temp 99.1 °F (37.3 °C) (Tympanic)   Resp 18   Ht 5' 2\" (1.575 m)   Wt 81.6 kg (180 lb)   SpO2 99%   BMI 32.92 kg/m²     Assessment and Plan   Laceration without foreign body of unspecified finger without damage to nail, initial encounter [S61.219A]  1. Laceration without foreign body of unspecified finger without damage to nail, initial encounter  Laceration repair    clindamycin (CLEOCIN) 300 MG capsule            Patient Instructions       Follow up with PCP in 3-5 days.  Proceed to  ER if symptoms worsen.    Chief Complaint     Chief Complaint   Patient presents with    Laceration     Laceration to right thumb about an hour ago.  Bleeding is controlled.         History of Present Illness       Pt with right thumb laceration from kitchen knife about 1 hour ago     Laceration         Review of Systems   Review of Systems   Constitutional: Negative.    HENT: Negative.     Eyes: Negative.    Respiratory: Negative.     Cardiovascular: Negative.    Gastrointestinal: Negative.    Endocrine: Negative.    Genitourinary: Negative.    Musculoskeletal: Negative.    Skin: Negative.    Allergic/Immunologic: Negative.    Neurological: Negative.    Hematological: Negative.    Psychiatric/Behavioral: Negative.     All other systems reviewed and are negative.        Current Medications       Current Outpatient Medications:     Albuterol Sulfate 108 (90 Base) MCG/ACT AEPB, , Disp: , Rfl:     amphetamine-dextroamphetamine (ADDERALL XR) 20 MG 24 hr capsule, Take 1 capsule (20 mg total) by mouth every morning Max Daily Amount: 20 mg, Disp: 30 capsule, Rfl: 0    amphetamine-dextroamphetamine (ADDERALL, 20MG,) 20 mg tablet, One tab in the afternoon, Disp: 30 tablet, Rfl: 0    azelastine (ASTELIN) 0.1 % nasal spray, 1 spray into each nostril 2 (two) times a day Use in each nostril as " directed, Disp: 30 mL, Rfl: 3    cetirizine (ZyrTEC) 10 mg tablet, Take 1 tablet (10 mg total) by mouth daily, Disp: 30 tablet, Rfl: 0    cholecalciferol (VITAMIN D3) 400 units tablet, Take by mouth daily, Disp: , Rfl:     clindamycin (CLEOCIN) 300 MG capsule, Take 1 capsule (300 mg total) by mouth 4 (four) times a day for 10 days, Disp: 40 capsule, Rfl: 0    clobetasol (TEMOVATE) 0.05 % external solution, APPLY TOPICALLY 2 (TWO) TIMES A DAY, Disp: 50 mL, Rfl: 1    EpiPen 2-Liang 0.3 MG/0.3ML SOAJ, Inject 0.3 mg into a muscle once, Disp: , Rfl:     fluticasone (FLONASE) 50 mcg/act nasal spray, 2 sprays into each nostril daily, Disp: 16 g, Rfl: 0    hydrOXYzine HCL (ATARAX) 10 mg tablet, Take 1 tablet (10 mg total) by mouth every 6 (six) hours as needed for itching, Disp: 30 tablet, Rfl: 3    ketoconazole (NIZORAL) 2 % shampoo, Apply 1 Application topically 2 (two) times a week, Disp: 120 mL, Rfl: 0    ondansetron (ZOFRAN) 4 mg tablet, TAKE 1 TABLET (4 MG TOTAL) BY MOUTH EVERY 8 (EIGHT) HOURS AS NEEDED FOR NAUSEA OR VOMITING, Disp: 21 tablet, Rfl: 1    pantoprazole (PROTONIX) 40 mg tablet, Take 1 tablet (40 mg total) by mouth daily before breakfast, Disp: 100 tablet, Rfl: 1    PARAGARD INTRAUTERINE COPPER IU, , Disp: , Rfl:     tirzepatide (Zepbound) 7.5 mg/0.5 mL auto-injector, Inject 0.5 mL (7.5 mg total) under the skin once a week, Disp: 2 mL, Rfl: 3    Current Allergies     Allergies as of 04/28/2025 - Reviewed 04/28/2025   Allergen Reaction Noted    Pollen extract Allergic Rhinitis, Cough, Eye Swelling, Hives, Itching, and Sneezing 03/08/2024    Cephalosporins Rash 02/27/2009            The following portions of the patient's history were reviewed and updated as appropriate: allergies, current medications, past family history, past medical history, past social history, past surgical history and problem list.     Past Medical History:   Diagnosis Date    Allergic        Past Surgical History:   Procedure Laterality  "Date    TONSILLECTOMY      WISDOM TOOTH EXTRACTION         Family History   Problem Relation Age of Onset    ADD / ADHD Mother     Spina bifida Mother     Diabetes Father     Stroke Father     Polycythemia Father          Medications have been verified.        Objective   Pulse 94   Temp 99.1 °F (37.3 °C) (Tympanic)   Resp 18   Ht 5' 2\" (1.575 m)   Wt 81.6 kg (180 lb)   SpO2 99%   BMI 32.92 kg/m²        Physical Exam     Physical Exam  Vitals and nursing note reviewed.   Constitutional:       Appearance: Normal appearance. She is normal weight.   HENT:      Head: Normocephalic and atraumatic.   Cardiovascular:      Rate and Rhythm: Normal rate and regular rhythm.      Pulses: Normal pulses.      Heart sounds: Normal heart sounds.   Pulmonary:      Effort: Pulmonary effort is normal.      Breath sounds: Normal breath sounds.   Musculoskeletal:         General: Normal range of motion.      Cervical back: Normal range of motion and neck supple.      Comments: Right thumb superficial flap avulsion laceration 1cm with thin attachment proximal thumb,     Several attempts with steri stips,  no success in controlling bleeding,   Gel foam applied and bulky dressing   Nail and nail bed wnl  from all joints distal neuro and vascular wnl   Discussed with pt about flap being too thin to hold a suture   Discussed with pt if any complication will have to see  hand specialist    Skin:     General: Skin is warm.   Neurological:      Mental Status: She is alert and oriented to person, place, and time.   Psychiatric:         Mood and Affect: Mood normal.             Universal Protocol:  Consent: Verbal consent obtained. Written consent not obtained.  Consent given by: patient  Laceration repair    Date/Time: 4/28/2025 7:30 PM    Performed by: Chencho Pineda Jr., PA-C  Authorized by: Chencho Pineda Jr., PA-C  Body area: upper extremity  Location details: right thumb  Laceration length: 1 cm  Foreign bodies: no foreign " bodies  Tendon involvement: none  Nerve involvement: none  Vascular damage: no    Wound Dehiscence:  Superficial Wound Dehiscence: simple closure      Procedure Details:  Preparation: Patient was prepped and draped in the usual sterile fashion.  Irrigation solution: saline  Irrigation method: syringe  Amount of cleaning: extensive  Debridement: none  Degree of undermining: none  Wound skin closure material used: gel foam.  Approximation difficulty: simple  Dressing: 4x4 sterile gauze  Patient tolerance: patient tolerated the procedure well with no immediate complications  Comments: Attempt to use lidocaine 1% pt does not tolerate    Bulky dressing applied over gelfoam   finger tip left open for sensation evaluation

## 2025-04-28 NOTE — LETTER
April 28, 2025     Patient: Christel Murray   YOB: 1995   Date of Visit: 4/28/2025       To Whom It May Concern:    It is my medical opinion that Christel Murray may return to work on 5/1/2025 .    If you have any questions or concerns, please don't hesitate to call.         Sincerely,        GOPAL BALES    CC: No Recipients

## 2025-04-29 ENCOUNTER — HOSPITAL ENCOUNTER (EMERGENCY)
Facility: HOSPITAL | Age: 30
Discharge: HOME | End: 2025-04-29
Attending: EMERGENCY MEDICINE
Payer: COMMERCIAL

## 2025-04-29 VITALS
OXYGEN SATURATION: 99 % | HEART RATE: 106 BPM | RESPIRATION RATE: 18 BRPM | DIASTOLIC BLOOD PRESSURE: 69 MMHG | SYSTOLIC BLOOD PRESSURE: 145 MMHG | TEMPERATURE: 97.7 F

## 2025-04-29 DIAGNOSIS — S61.219A FINGER LACERATION: Primary | ICD-10-CM

## 2025-04-29 PROCEDURE — 12004 RPR S/N/AX/GEN/TRK7.6-12.5CM: CPT | Performed by: EMERGENCY MEDICINE

## 2025-04-29 PROCEDURE — 99282 EMERGENCY DEPT VISIT SF MDM: CPT

## 2025-04-29 PROCEDURE — 99284 EMERGENCY DEPT VISIT MOD MDM: CPT | Performed by: EMERGENCY MEDICINE

## 2025-04-29 RX ORDER — LIDOCAINE HYDROCHLORIDE AND EPINEPHRINE 10; 10 MG/ML; UG/ML
10 INJECTION, SOLUTION INFILTRATION; PERINEURAL ONCE
Status: COMPLETED | OUTPATIENT
Start: 2025-04-29 | End: 2025-04-29

## 2025-04-29 RX ORDER — LIDOCAINE HYDROCHLORIDE 10 MG/ML
10 INJECTION, SOLUTION EPIDURAL; INFILTRATION; INTRACAUDAL; PERINEURAL ONCE
Status: COMPLETED | OUTPATIENT
Start: 2025-04-29 | End: 2025-04-29

## 2025-04-29 RX ADMIN — LIDOCAINE HYDROCHLORIDE 10 ML: 10 INJECTION, SOLUTION EPIDURAL; INFILTRATION; INTRACAUDAL; PERINEURAL at 15:47

## 2025-04-29 RX ADMIN — LIDOCAINE HYDROCHLORIDE,EPINEPHRINE BITARTRATE 10 ML: 10; .01 INJECTION, SOLUTION INFILTRATION; PERINEURAL at 15:47

## 2025-04-29 NOTE — DISCHARGE INSTRUCTIONS
You are seen today for a finger laceration    Please continue taking your antibiotics    Please return to the ER if you develop any numbness, weakness, swelling, drainage, redness, increased pain, fever, chills, any new symptoms

## 2025-04-29 NOTE — ED PROVIDER NOTES
Time reflects when diagnosis was documented in both MDM as applicable and the Disposition within this note       Time User Action Codes Description Comment    4/29/2025  3:31 PM Brad Geronimo Add [S61.219A] Finger laceration           ED Disposition       ED Disposition   Discharge    Condition   Stable    Date/Time   Tue Apr 29, 2025  3:31 PM    Comment   Christel Terri discharge to home/self care.                   Assessment & Plan       Medical Decision Making  Patient is well-appearing on exam GCS 15, AO x 4.  Patient was seen yesterday urgent care.  They told her the flap was too thin to suture so they attempted glue and Steri-Strip without success.  She was placed on clindamycin.  On evaluation here bleeding controlled.  Given it has been 20 hours since the wound occurred and patient's flap appears marginal in terms of its survivability.  Shared decision making conversation was had with the patient the options of feeling back to flap getting doing full wound washout versus placing sutures to tack the flap down, leaving the flap in place and irrigating as best as possible in hopes of preserving the flap.  The patient elected to proceed with tacking down the flap with sutures and irrigation.  This was performed according to procedure note.  Patient to continue taking her antibiotics, have the sutures removed in 7 days and to return to the ED with strict return precautions.    Differential diagnosis includes but is not limited to: Laceration    Based on patient's clinical history and physical exam there are no red flag signs or symptoms     Patient denies any additional symptoms on direct questioning except those explicitly noted in the HPI and ROS.     Triage note was reviewed and patient asked directly about concerns mentioned in triage note.     I will order appropriate testing to narrow my differential    Unless otherwise noted:  - There is no language barrier  - Chart was reviewed   - Labs and imaging were  reviewed    Risk  Prescription drug management.        ED Course as of 04/29/25 2314   Tue Apr 29, 2025   1701 Called patient and told her to have stitches taken out in 7 days       Medications       ED Risk Strat Scores                    No data recorded        SBIRT 22yo+      Flowsheet Row Most Recent Value   Initial Alcohol Screen: US AUDIT-C     1. How often do you have a drink containing alcohol? 0 Filed at: 04/29/2025 1544   2. How many drinks containing alcohol do you have on a typical day you are drinking?  0 Filed at: 04/29/2025 1544   3b. FEMALE Any Age, or MALE 65+: How often do you have 4 or more drinks on one occassion? 0 Filed at: 04/29/2025 1544   Audit-C Score 0 Filed at: 04/29/2025 1544   BYRON: How many times in the past year have you...    Used an illegal drug or used a prescription medication for non-medical reasons? Never Filed at: 04/29/2025 1544                            History of Present Illness       Chief Complaint   Patient presents with    Finger Laceration     C/o of cutting right thumb on mandolin yesterday. Unable to get the flap closed.        Past Medical History:   Diagnosis Date    Allergic       Past Surgical History:   Procedure Laterality Date    TONSILLECTOMY      WISDOM TOOTH EXTRACTION        Family History   Problem Relation Age of Onset    ADD / ADHD Mother     Spina bifida Mother     Diabetes Father     Stroke Father     Polycythemia Father       Social History     Tobacco Use    Smoking status: Never    Smokeless tobacco: Never   Vaping Use    Vaping status: Never Used   Substance Use Topics    Alcohol use: Never    Drug use: Never      E-Cigarette/Vaping    E-Cigarette Use Never User       E-Cigarette/Vaping Substances    Nicotine No     THC No     CBD No     Flavoring No     Other No     Unknown No       I have reviewed and agree with the history as documented.     Patient is 30-year-old female with no past medical history presents the emergency department  approximately 20 hours after cutting her right dorsal thumb with a mandolin.  That evening she went to urgent care they attempted to glue and Steri-Strip it without success.  Surgifoam was placed on it and it was wrapped and she was sent home.  She presents today for wound reevaluation.  No fevers or chills.  No numbness or weakness.  Bleeding has been controlled since last night.  They washed the wound out at the urgent care, patient's last tetanus in 2022.        Review of Systems   Constitutional:  Negative for chills and fever.   Respiratory:  Negative for cough and shortness of breath.    Cardiovascular:  Negative for chest pain and leg swelling.   Gastrointestinal:  Negative for abdominal pain.   Genitourinary:  Negative for dysuria.   Neurological:  Negative for seizures and syncope.   All other systems reviewed and are negative.          Objective       ED Triage Vitals [04/29/25 1346]   Temperature Pulse Blood Pressure Respirations SpO2 Patient Position - Orthostatic VS   97.7 °F (36.5 °C) (!) 106 145/69 18 99 % --      Temp Source Heart Rate Source BP Location FiO2 (%) Pain Score    Temporal Monitor -- -- 2      Vitals      Date and Time Temp Pulse SpO2 Resp BP Pain Score FACES Pain Rating User   04/29/25 1346 97.7 °F (36.5 °C) 106 99 % 18 145/69 2 -- CS            Physical Exam  Vitals reviewed.   Constitutional:       General: She is not in acute distress.     Appearance: Normal appearance. She is not ill-appearing, toxic-appearing or diaphoretic.   HENT:      Head: Normocephalic and atraumatic.      Nose: Nose normal.   Eyes:      General: No scleral icterus.  Pulmonary:      Effort: Pulmonary effort is normal. No tachypnea, bradypnea or accessory muscle usage.   Musculoskeletal:        Hands:       Right lower leg: No edema.      Left lower leg: No edema.   Skin:     General: Skin is warm and dry.      Capillary Refill: Capillary refill takes less than 2 seconds.   Neurological:      Mental Status: She  is alert and oriented to person, place, and time.      GCS: GCS eye subscore is 4. GCS verbal subscore is 5. GCS motor subscore is 6.      Cranial Nerves: No dysarthria or facial asymmetry.   Psychiatric:         Mood and Affect: Mood normal.         Behavior: Behavior normal. Behavior is cooperative.         Thought Content: Thought content normal.         Judgment: Judgment normal.         Results Reviewed       None            No orders to display       Universal Protocol:  Consent given by: patient  Patient identity confirmed: verbally with patient  Laceration repair    Date/Time: 4/29/2025 11:12 PM    Performed by: Brad Geronimo MD  Authorized by: Brad Geronimo MD  Body area: upper extremity  Location details: right thumb  Laceration length: 11 cm  Anesthesia: local infiltration and digital block    Anesthesia:  Local Anesthetic: lidocaine 1% without epinephrine  Anesthetic total: 6 mL    Sedation:  Patient sedated: no      Wound Dehiscence:  Superficial Wound Dehiscence: simple closure      Procedure Details:  Irrigation solution: saline  Irrigation method: jet lavage and syringe  Amount of cleaning: extensive  Debridement: none  Degree of undermining: none  Skin closure: 4-0 nylon  Number of sutures: 4  Technique: simple  Approximation: loose  Approximation difficulty: simple  Dressing: gauze roll, splint and non-adhesive packing strip  Patient tolerance: patient tolerated the procedure well with no immediate complications  Comments: Copious irrigation was performed after the flap was tacked down.  Flap remained in place.           ED Medication and Procedure Management   Prior to Admission Medications   Prescriptions Last Dose Informant Patient Reported? Taking?   Albuterol Sulfate 108 (90 Base) MCG/ACT AEPB   Yes No   EpiPen 2-Liang 0.3 MG/0.3ML SOAJ   Yes No   Sig: Inject 0.3 mg into a muscle once   PARAGARD INTRAUTERINE COPPER IU   Yes No   amphetamine-dextroamphetamine (ADDERALL XR) 20 MG 24 hr capsule    No No   Sig: Take 1 capsule (20 mg total) by mouth every morning Max Daily Amount: 20 mg   amphetamine-dextroamphetamine (ADDERALL, 20MG,) 20 mg tablet   No No   Sig: One tab in the afternoon   azelastine (ASTELIN) 0.1 % nasal spray   No No   Si spray into each nostril 2 (two) times a day Use in each nostril as directed   cetirizine (ZyrTEC) 10 mg tablet   No No   Sig: Take 1 tablet (10 mg total) by mouth daily   cholecalciferol (VITAMIN D3) 400 units tablet   Yes No   Sig: Take by mouth daily   clindamycin (CLEOCIN) 300 MG capsule   No No   Sig: Take 1 capsule (300 mg total) by mouth 4 (four) times a day for 10 days   clobetasol (TEMOVATE) 0.05 % external solution   No No   Sig: APPLY TOPICALLY 2 (TWO) TIMES A DAY   fluticasone (FLONASE) 50 mcg/act nasal spray   No No   Si sprays into each nostril daily   hydrOXYzine HCL (ATARAX) 10 mg tablet   No No   Sig: Take 1 tablet (10 mg total) by mouth every 6 (six) hours as needed for itching   ketoconazole (NIZORAL) 2 % shampoo   No No   Sig: Apply 1 Application topically 2 (two) times a week   ondansetron (ZOFRAN) 4 mg tablet   No No   Sig: TAKE 1 TABLET (4 MG TOTAL) BY MOUTH EVERY 8 (EIGHT) HOURS AS NEEDED FOR NAUSEA OR VOMITING   pantoprazole (PROTONIX) 40 mg tablet   No No   Sig: Take 1 tablet (40 mg total) by mouth daily before breakfast   tirzepatide (Zepbound) 7.5 mg/0.5 mL auto-injector   No No   Sig: Inject 0.5 mL (7.5 mg total) under the skin once a week      Facility-Administered Medications: None     Discharge Medication List as of 2025  4:03 PM        CONTINUE these medications which have NOT CHANGED    Details   Albuterol Sulfate 108 (90 Base) MCG/ACT AEPB Historical Med      amphetamine-dextroamphetamine (ADDERALL XR) 20 MG 24 hr capsule Take 1 capsule (20 mg total) by mouth every morning Max Daily Amount: 20 mg, Starting Wed 2025, Normal      amphetamine-dextroamphetamine (ADDERALL, 20MG,) 20 mg tablet One tab in the afternoon, Normal       azelastine (ASTELIN) 0.1 % nasal spray 1 spray into each nostril 2 (two) times a day Use in each nostril as directed, Starting Tue 4/15/2025, Normal      cetirizine (ZyrTEC) 10 mg tablet Take 1 tablet (10 mg total) by mouth daily, Starting Wed 11/27/2024, Normal      cholecalciferol (VITAMIN D3) 400 units tablet Take by mouth daily, Historical Med      clindamycin (CLEOCIN) 300 MG capsule Take 1 capsule (300 mg total) by mouth 4 (four) times a day for 10 days, Starting Mon 4/28/2025, Until Thu 5/8/2025, Normal      clobetasol (TEMOVATE) 0.05 % external solution APPLY TOPICALLY 2 (TWO) TIMES A DAY, Starting Wed 2/19/2025, Normal      EpiPen 2-Liang 0.3 MG/0.3ML SOAJ Inject 0.3 mg into a muscle once, Starting Tue 12/5/2023, Historical Med      fluticasone (FLONASE) 50 mcg/act nasal spray 2 sprays into each nostril daily, Starting Tue 4/15/2025, Normal      hydrOXYzine HCL (ATARAX) 10 mg tablet Take 1 tablet (10 mg total) by mouth every 6 (six) hours as needed for itching, Starting Fri 3/8/2024, Normal      ketoconazole (NIZORAL) 2 % shampoo Apply 1 Application topically 2 (two) times a week, Starting Thu 11/28/2024, Normal      ondansetron (ZOFRAN) 4 mg tablet TAKE 1 TABLET (4 MG TOTAL) BY MOUTH EVERY 8 (EIGHT) HOURS AS NEEDED FOR NAUSEA OR VOMITING, Starting Fri 4/18/2025, Normal      pantoprazole (PROTONIX) 40 mg tablet Take 1 tablet (40 mg total) by mouth daily before breakfast, Starting Wed 1/22/2025, Normal      PARAGARD INTRAUTERINE COPPER IU Historical Med      tirzepatide (Zepbound) 7.5 mg/0.5 mL auto-injector Inject 0.5 mL (7.5 mg total) under the skin once a week, Starting Tue 4/15/2025, Normal           No discharge procedures on file.  ED SEPSIS DOCUMENTATION   Time reflects when diagnosis was documented in both MDM as applicable and the Disposition within this note       Time User Action Codes Description Comment    4/29/2025  3:31 PM Brad Geronimo Add [R26.131I] Finger laceration                  Brad  MD Juanpablo  04/29/25 1773

## 2025-04-29 NOTE — Clinical Note
Christel Terri was seen and treated in our emergency department on 4/29/2025.                Diagnosis:     Christel  .    She may return on this date: 05/06/2025    No lifting, light duty, no grasping with right hand until May 28, 2025     If you have any questions or concerns, please don't hesitate to call.      Brad Geronimo MD    ______________________________           _______________          _______________  Hospital Representative                              Date                                Time

## 2025-04-29 NOTE — ED ATTENDING ATTESTATION
4/29/2025  I, Lion Lund MD, saw and evaluated the patient. I have discussed the patient with the resident/non-physician practitioner and agree with the resident's/non-physician practitioner's findings, Plan of Care, and MDM as documented in the resident's/non-physician practitioner's note, except where noted. All available labs and Radiology studies were reviewed.  I was present for key portions of any procedure(s) performed by the resident/non-physician practitioner and I was immediately available to provide assistance.       At this point I agree with the current assessment done in the Emergency Department.  I have conducted an independent evaluation of this patient a history and physical is as follows:    ED Course     Patient presents for evaluation 1 day after cutting her right thumb on a mandolin yesterday.  Patient went to urgent care where they had some difficulty stopping the bleeding and were unable to place Steri-Strips.  He did not feel that there was enough tissue to attempt to close with sutures so they placed a Gelfoam dressing and a bulky bandage.  Patient came to the ED today to have the wound evaluated.  She was also started on antibiotics for prophylaxis.  Patient denies any difficulty with moving her thumb.  Exam: AAOx3, NAD, flap laceration on lateral aspect of R thumb with some dusky discoloration to flap, FROM of thumb. A/P: Thumb laceration. Given appearance of flap, likely that blood supply was compromised. Discussed options with patient. Will attempt to tack flap down for bleeding control and to protect underlying tissue while it heals. Patient understands that she will likely lose the flap of tissue regardless of treatment. Will instruct to watch closely for signs of infection.    Critical Care Time  Procedures

## 2025-04-29 NOTE — Clinical Note
Christel Cedeñor was seen and treated in our emergency department on 4/29/2025.                Diagnosis:     Christel  may return to work on return date.    She may return on this date: 04/30/2025         If you have any questions or concerns, please don't hesitate to call.      Brad Geronimo MD    ______________________________           _______________          _______________  Hospital Representative                              Date                                Time

## 2025-05-07 ENCOUNTER — OFFICE VISIT (OUTPATIENT)
Dept: FAMILY MEDICINE CLINIC | Facility: CLINIC | Age: 30
End: 2025-05-07
Payer: COMMERCIAL

## 2025-05-07 VITALS
HEART RATE: 90 BPM | TEMPERATURE: 98.4 F | BODY MASS INDEX: 34.96 KG/M2 | OXYGEN SATURATION: 98 % | SYSTOLIC BLOOD PRESSURE: 112 MMHG | WEIGHT: 190 LBS | HEIGHT: 62 IN | DIASTOLIC BLOOD PRESSURE: 72 MMHG | RESPIRATION RATE: 16 BRPM

## 2025-05-07 DIAGNOSIS — S61.011D LACERATION OF RIGHT THUMB WITHOUT FOREIGN BODY WITHOUT DAMAGE TO NAIL, SUBSEQUENT ENCOUNTER: Primary | ICD-10-CM

## 2025-05-07 DIAGNOSIS — Z48.02 ENCOUNTER FOR REMOVAL OF SUTURES: ICD-10-CM

## 2025-05-07 PROBLEM — S61.011A LACERATION OF RIGHT THUMB WITHOUT FOREIGN BODY WITHOUT DAMAGE TO NAIL: Status: ACTIVE | Noted: 2025-05-07

## 2025-05-07 PROCEDURE — 99213 OFFICE O/P EST LOW 20 MIN: CPT | Performed by: FAMILY MEDICINE

## 2025-05-07 NOTE — ASSESSMENT & PLAN NOTE
- Four sutures removed from flap-shaped laceration on R dorsal thumb.   - The wound was cleansed with alcohol swabs before and after suture removal.   - Steri-strips and bacitracin applied. The wound was covered with non-adherent pad and wrapped with coban.   - Advised pt to keep site clean and dry. When steri-strips fall off, clean site thoroughly with soap and water and re-apply steri-strips (pt given supplies). Continue until wound edges heal.  - Plan 2 week f/u.   Orders:  •  Suture removal

## 2025-05-07 NOTE — PROGRESS NOTES
"Name: Christel Murray      : 1995      MRN: [id number]  Encounter Provider: Kaity Echevarria MD  Encounter Date: 2025   Encounter department: [facility] [address] [facility]  :  Assessment & Plan  Laceration of right thumb without foreign body without damage to nail, subsequent encounter  No sign of infection.  Continue to keep the wound clean and dry.  Come back in 2 weeks and was told to stay out of work until .  Orders:  •  Suture removal    Encounter for removal of sutures  - Four sutures removed from flap-shaped laceration on R dorsal thumb.   - The wound was cleansed with alcohol swabs before and after suture removal.   - Steri-strips and bacitracin applied. The wound was covered with non-adherent pad and wrapped with coban.   - Advised pt to keep site clean and dry. When steri-strips fall off, clean site thoroughly with soap and water and re-apply steri-strips (pt given supplies). Continue until wound edges heal.  - Plan 2 week f/u.   Orders:  •  Suture removal         History of Present Illness   Christel Murray is a 29 y/o female presenting for suture removal appointment after laceration of R thumb on  while slicing carrots with mandolin. The wound flap was tacked down with 4 sutures at ER on . The patient has been cleansing and re-bandaging the wound at least once daily.     Suture / Staple Removal      Review of Systems   Constitutional:  Negative for chills and fever.   Skin:  Positive for wound (R thumb). Negative for rash.       Objective   /72 (BP Location: Left arm, Patient Position: Sitting, Cuff Size: Large)   Pulse 90   Temp 98.4 °F (36.9 °C) (Tympanic)   Resp 16   Ht 5' 2\" (1.575 m)   Wt 86.2 kg (190 lb)   SpO2 98%   BMI 34.75 kg/m²      Physical Exam  Vitals reviewed.   Constitutional:       General: She is not in acute distress.     Appearance: She is well-developed. She is obese.   HENT:      Head: Normocephalic and atraumatic.   Eyes:      Extraocular " Movements: Extraocular movements intact.      Conjunctiva/sclera: Conjunctivae normal.   Abdominal:      Palpations: Abdomen is soft.   Musculoskeletal:      Cervical back: Normal range of motion.   Skin:     General: Skin is warm and dry.      Capillary Refill: Capillary refill takes less than 2 seconds.      Findings: Laceration present.      Comments: Laceration on R dorsal thumb clean and dry. Four sutures intact upon presentation. Slightly ashen discoloration of flap skin. R thumb grossly neurovascularly intact.   Neurological:      Mental Status: She is alert.   Psychiatric:         Mood and Affect: Mood normal.     Suture removal    Date/Time: 5/7/2025 2:00 PM    Performed by: Kaity Echevarria MD  Authorized by: Kaity Echevarria MD    [place] Protocol:  procedure performed by consultantConsent: Verbal consent obtained.  Patient understanding: patient states understanding of the procedure being performed  Patient identity confirmed: verbally with patient      Patient location:  Clinic  Location:     Laterality:  Right    Location:  Upper extremity    Upper extremity location:  Hand    Hand location:  R thumb  Procedure details:     Tools used:  Suture removal kit    Wound appearance:  No sign(s) of infection and tender    Number of sutures removed:  4    Number of staples removed:  4  Post-procedure details:     Post-removal:  Steri-Strips applied    Patient tolerance of procedure:  Tolerated well, no immediate complications

## 2025-05-07 NOTE — ASSESSMENT & PLAN NOTE
No sign of infection.  Continue to keep the wound clean and dry.  Come back in 2 weeks and was told to stay out of work until June 2.  Orders:  •  Suture removal

## 2025-05-21 ENCOUNTER — OFFICE VISIT (OUTPATIENT)
Dept: FAMILY MEDICINE CLINIC | Facility: CLINIC | Age: 30
End: 2025-05-21
Payer: COMMERCIAL

## 2025-05-21 VITALS
RESPIRATION RATE: 16 BRPM | TEMPERATURE: 98.5 F | OXYGEN SATURATION: 98 % | HEART RATE: 96 BPM | HEIGHT: 62 IN | BODY MASS INDEX: 34.34 KG/M2 | DIASTOLIC BLOOD PRESSURE: 78 MMHG | WEIGHT: 186.6 LBS | SYSTOLIC BLOOD PRESSURE: 118 MMHG

## 2025-05-21 DIAGNOSIS — S61.011S LACERATION OF RIGHT THUMB WITHOUT FOREIGN BODY WITHOUT DAMAGE TO NAIL, SEQUELA: ICD-10-CM

## 2025-05-21 DIAGNOSIS — L08.9 SKIN INFECTION: Primary | ICD-10-CM

## 2025-05-21 DIAGNOSIS — B37.31 YEAST VAGINITIS: ICD-10-CM

## 2025-05-21 PROCEDURE — 99213 OFFICE O/P EST LOW 20 MIN: CPT | Performed by: FAMILY MEDICINE

## 2025-05-21 RX ORDER — MUPIROCIN 20 MG/G
OINTMENT TOPICAL 3 TIMES DAILY
Qty: 30 G | Refills: 1 | Status: SHIPPED | OUTPATIENT
Start: 2025-05-21

## 2025-05-21 RX ORDER — FLUCONAZOLE 150 MG/1
150 TABLET ORAL ONCE
Qty: 1 TABLET | Refills: 0 | Status: SHIPPED | OUTPATIENT
Start: 2025-05-21 | End: 2025-05-21

## 2025-05-22 PROBLEM — L08.9 SKIN INFECTION: Status: ACTIVE | Noted: 2025-05-22

## 2025-05-22 PROBLEM — B37.31 YEAST VAGINITIS: Status: ACTIVE | Noted: 2025-05-22

## 2025-05-22 NOTE — ASSESSMENT & PLAN NOTE
Improving but not resolved completely.  Continue with pain and education on the skin.  She was told to use Bactroban and keep the area clean and dry.  She is cleared to go back to work on June 2.  At

## 2025-05-22 NOTE — PROGRESS NOTES
Name: Christel Murray      : 1995      MRN: [id number]  Encounter Provider: Kaity Echevarria MD  Encounter Date: 2025   Encounter department: [facility]  :  Assessment & Plan  Skin infection    Orders:  •  mupirocin (BACTROBAN) 2 % ointment; Apply topically 3 (three) times a day    Yeast vaginitis    Orders:  •  fluconazole (DIFLUCAN) 150 mg tablet; Take 1 tablet (150 mg total) by mouth once for 1 dose    Laceration of right thumb without foreign body without damage to nail, sequela  Improving but not resolved completely.  Continue with pain and education on the skin.  She was told to use Bactroban and keep the area clean and dry.  She is cleared to go back to work on .  At              History of Present Illness   She is here today for follow-up for right thumb laceration status post suture removal.  She stated she has been keeping the area clean and dry has been applying triple antibiotic ointment.  She continues with some pain and drainage on and off.  She denies any fever or chills.      Review of Systems   Constitutional:  Negative for chills and fever.   HENT:  Negative for trouble swallowing.    Eyes:  Negative for visual disturbance.   Respiratory:  Negative for cough and shortness of breath.    Cardiovascular:  Negative for chest pain, palpitations and leg swelling.   Gastrointestinal:  Negative for abdominal pain, constipation and diarrhea.   Endocrine: Negative for cold intolerance and heat intolerance.   Genitourinary:  Negative for difficulty urinating and dysuria.   Musculoskeletal:  Negative for gait problem.   Skin:  Positive for color change and wound.   Neurological:  Negative for dizziness, tremors, seizures and headaches.   Hematological:  Negative for adenopathy.   Psychiatric/Behavioral:  Negative for behavioral problems.        Objective   /78 (BP Location: Left arm, Patient Position: Sitting, Cuff Size: Standard)   Pulse 96   Temp 98.5 °F  "(36.9 °C) (Tympanic)   Resp 16   Ht 5' 2\" (1.575 m)   Wt 84.6 kg (186 lb 9.6 oz)   SpO2 98%   BMI 34.13 kg/m²      Physical Exam  Vitals and nursing note reviewed.   Constitutional:       Appearance: She is well-developed.   HENT:      Head: Normocephalic and atraumatic.     Eyes:      Pupils: Pupils are equal, round, and reactive to light.       Cardiovascular:      Rate and Rhythm: Normal rate and regular rhythm.      Heart sounds: Normal heart sounds.   Pulmonary:      Effort: Pulmonary effort is normal.      Breath sounds: Normal breath sounds.   Abdominal:      General: Bowel sounds are normal.      Palpations: Abdomen is soft.     Musculoskeletal:      Cervical back: Normal range of motion and neck supple.   Lymphadenopathy:      Cervical: No cervical adenopathy.     Skin:     General: Skin is warm.      Findings: Erythema present.     Neurological:      Mental Status: She is alert and oriented to person, place, and time.         "

## 2025-05-25 DIAGNOSIS — J30.89 ALLERGIC RHINITIS DUE TO OTHER ALLERGIC TRIGGER, UNSPECIFIED SEASONALITY: ICD-10-CM

## 2025-05-25 RX ORDER — FLUTICASONE PROPIONATE 50 MCG
SPRAY, SUSPENSION (ML) NASAL
Qty: 16 ML | Refills: 0 | Status: SHIPPED | OUTPATIENT
Start: 2025-05-25

## 2025-05-30 ENCOUNTER — APPOINTMENT (OUTPATIENT)
Dept: LAB | Facility: IMAGING CENTER | Age: 30
End: 2025-05-30
Payer: COMMERCIAL

## 2025-05-30 DIAGNOSIS — Z00.8 HEALTH EXAMINATION IN POPULATION SURVEY: ICD-10-CM

## 2025-05-30 DIAGNOSIS — Z00.01 ABNORMAL WELLNESS EXAM: ICD-10-CM

## 2025-05-30 LAB
ALBUMIN SERPL BCG-MCNC: 4.3 G/DL (ref 3.5–5)
ALP SERPL-CCNC: 80 U/L (ref 34–104)
ALT SERPL W P-5'-P-CCNC: 30 U/L (ref 7–52)
ANION GAP SERPL CALCULATED.3IONS-SCNC: 8 MMOL/L (ref 4–13)
AST SERPL W P-5'-P-CCNC: 23 U/L (ref 13–39)
BASOPHILS # BLD AUTO: 0.06 THOUSANDS/ÂΜL (ref 0–0.1)
BASOPHILS NFR BLD AUTO: 1 % (ref 0–1)
BILIRUB SERPL-MCNC: 0.42 MG/DL (ref 0.2–1)
BUN SERPL-MCNC: 14 MG/DL (ref 5–25)
CALCIUM SERPL-MCNC: 9.1 MG/DL (ref 8.4–10.2)
CHLORIDE SERPL-SCNC: 105 MMOL/L (ref 96–108)
CHOLEST SERPL-MCNC: 148 MG/DL (ref ?–200)
CO2 SERPL-SCNC: 26 MMOL/L (ref 21–32)
CREAT SERPL-MCNC: 0.83 MG/DL (ref 0.6–1.3)
EOSINOPHIL # BLD AUTO: 0.13 THOUSAND/ÂΜL (ref 0–0.61)
EOSINOPHIL NFR BLD AUTO: 2 % (ref 0–6)
ERYTHROCYTE [DISTWIDTH] IN BLOOD BY AUTOMATED COUNT: 13.2 % (ref 11.6–15.1)
GFR SERPL CREATININE-BSD FRML MDRD: 94 ML/MIN/1.73SQ M
GLUCOSE P FAST SERPL-MCNC: 95 MG/DL (ref 65–99)
HCT VFR BLD AUTO: 39.9 % (ref 34.8–46.1)
HDLC SERPL-MCNC: 65 MG/DL
HGB BLD-MCNC: 13.6 G/DL (ref 11.5–15.4)
IMM GRANULOCYTES # BLD AUTO: 0.03 THOUSAND/UL (ref 0–0.2)
IMM GRANULOCYTES NFR BLD AUTO: 0 % (ref 0–2)
LDLC SERPL CALC-MCNC: 65 MG/DL (ref 0–100)
LYMPHOCYTES # BLD AUTO: 3.01 THOUSANDS/ÂΜL (ref 0.6–4.47)
LYMPHOCYTES NFR BLD AUTO: 34 % (ref 14–44)
MCH RBC QN AUTO: 30.6 PG (ref 26.8–34.3)
MCHC RBC AUTO-ENTMCNC: 34.1 G/DL (ref 31.4–37.4)
MCV RBC AUTO: 90 FL (ref 82–98)
MONOCYTES # BLD AUTO: 0.95 THOUSAND/ÂΜL (ref 0.17–1.22)
MONOCYTES NFR BLD AUTO: 11 % (ref 4–12)
NEUTROPHILS # BLD AUTO: 4.58 THOUSANDS/ÂΜL (ref 1.85–7.62)
NEUTS SEG NFR BLD AUTO: 52 % (ref 43–75)
NRBC BLD AUTO-RTO: 0 /100 WBCS
PLATELET # BLD AUTO: 366 THOUSANDS/UL (ref 149–390)
PMV BLD AUTO: 11.4 FL (ref 8.9–12.7)
POTASSIUM SERPL-SCNC: 3.8 MMOL/L (ref 3.5–5.3)
PROT SERPL-MCNC: 7.2 G/DL (ref 6.4–8.4)
RBC # BLD AUTO: 4.45 MILLION/UL (ref 3.81–5.12)
SODIUM SERPL-SCNC: 139 MMOL/L (ref 135–147)
TRIGL SERPL-MCNC: 92 MG/DL (ref ?–150)
TSH SERPL DL<=0.05 MIU/L-ACNC: 1.56 UIU/ML (ref 0.45–4.5)
WBC # BLD AUTO: 8.76 THOUSAND/UL (ref 4.31–10.16)

## 2025-05-30 PROCEDURE — 85025 COMPLETE CBC W/AUTO DIFF WBC: CPT

## 2025-05-30 PROCEDURE — 36415 COLL VENOUS BLD VENIPUNCTURE: CPT

## 2025-05-30 PROCEDURE — 84443 ASSAY THYROID STIM HORMONE: CPT

## 2025-05-30 PROCEDURE — 80053 COMPREHEN METABOLIC PANEL: CPT

## 2025-05-30 PROCEDURE — 83036 HEMOGLOBIN GLYCOSYLATED A1C: CPT

## 2025-05-30 PROCEDURE — 80061 LIPID PANEL: CPT

## 2025-05-31 DIAGNOSIS — R23.8 SCALP IRRITATION: ICD-10-CM

## 2025-05-31 LAB
EST. AVERAGE GLUCOSE BLD GHB EST-MCNC: 108 MG/DL
HBA1C MFR BLD: 5.4 %

## 2025-06-01 RX ORDER — CLOBETASOL PROPIONATE 0.5 MG/ML
SOLUTION TOPICAL 2 TIMES DAILY
Qty: 50 ML | Refills: 1 | Status: SHIPPED | OUTPATIENT
Start: 2025-06-01

## 2025-06-03 ENCOUNTER — RESULTS FOLLOW-UP (OUTPATIENT)
Dept: FAMILY MEDICINE CLINIC | Facility: CLINIC | Age: 30
End: 2025-06-03

## 2025-06-06 PROBLEM — S61.011A LACERATION OF RIGHT THUMB WITHOUT FOREIGN BODY WITHOUT DAMAGE TO NAIL: Status: RESOLVED | Noted: 2025-05-07 | Resolved: 2025-06-06

## 2025-06-09 DIAGNOSIS — J30.2 SEASONAL ALLERGIES: ICD-10-CM

## 2025-06-09 DIAGNOSIS — F90.0 ATTENTION DEFICIT HYPERACTIVITY DISORDER (ADHD), PREDOMINANTLY INATTENTIVE TYPE: ICD-10-CM

## 2025-06-09 DIAGNOSIS — E66.9 OBESITY (BMI 30-39.9): ICD-10-CM

## 2025-06-09 DIAGNOSIS — J30.89 ALLERGIC RHINITIS DUE TO OTHER ALLERGIC TRIGGER, UNSPECIFIED SEASONALITY: ICD-10-CM

## 2025-06-10 RX ORDER — TIRZEPATIDE 7.5 MG/.5ML
7.5 INJECTION, SOLUTION SUBCUTANEOUS WEEKLY
Qty: 2 ML | Refills: 0 | Status: SHIPPED | OUTPATIENT
Start: 2025-06-10

## 2025-06-10 RX ORDER — FLUTICASONE PROPIONATE 50 MCG
2 SPRAY, SUSPENSION (ML) NASAL DAILY
Qty: 16 ML | Refills: 0 | Status: SHIPPED | OUTPATIENT
Start: 2025-06-10

## 2025-06-10 RX ORDER — AZELASTINE 1 MG/ML
1 SPRAY, METERED NASAL 2 TIMES DAILY
Qty: 30 ML | Refills: 0 | Status: SHIPPED | OUTPATIENT
Start: 2025-06-10

## 2025-06-10 RX ORDER — DEXTROAMPHETAMINE SACCHARATE, AMPHETAMINE ASPARTATE MONOHYDRATE, DEXTROAMPHETAMINE SULFATE AND AMPHETAMINE SULFATE 5; 5; 5; 5 MG/1; MG/1; MG/1; MG/1
20 CAPSULE, EXTENDED RELEASE ORAL EVERY MORNING
Qty: 30 CAPSULE | Refills: 0 | Status: SHIPPED | OUTPATIENT
Start: 2025-06-10

## 2025-06-10 RX ORDER — DEXTROAMPHETAMINE SACCHARATE, AMPHETAMINE ASPARTATE, DEXTROAMPHETAMINE SULFATE AND AMPHETAMINE SULFATE 5; 5; 5; 5 MG/1; MG/1; MG/1; MG/1
TABLET ORAL
Qty: 30 TABLET | Refills: 0 | Status: SHIPPED | OUTPATIENT
Start: 2025-06-10

## 2025-06-10 NOTE — TELEPHONE ENCOUNTER
Refills have been requested for the following medications:         amphetamine-dextroamphetamine (ADDERALL XR) 20 MG 24 hr capsule [Karen Echevarria MD]         amphetamine-dextroamphetamine (ADDERALL, 20MG,) 20 mg tablet [Karen Echevarria MD]         azelastine (ASTELIN) 0.1 % nasal spray [Karen Echevarria MD]         tirzepatide (Zepbound) 7.5 mg/0.5 mL auto-injector [Karen Echevarria MD]         fluticasone (FLONASE) 50 mcg/act nasal spray [Karen Echevarria MD]     Preferred pharmacy: [facility] - [address]    Last seen 5/21/25  Has appt 7/15/25     1 BRENNON MADHURI 1995 F [id number] [place] [address] [place]      Search Criteria  Name Date of Birth Date Range  Brennon Madhuri 1995 06- To 06-  Requester Name Requested Date  KAREN ECHEVARRIA 06- 13:17:03 ([facility])  Summary  Prescriptions  8  Prescribers  1  Pharmacies  2  Drug Classes  Benzodiazepines  0  Stimulants  8  Opioids  0  Muscle Relaxants  0  Opioid Dosage  Total MME for Active Prescriptions  0    Average MME  0.00         Prescriptions  Notifications    Prescribers  Pharmacies  MME Graph    Show All     [place]   Drug Categories:      Stimulants     Show  10  entries  Search:  Patient Id Prescription # Sold Filled Written Drug Label Qty Days Strength MME* Prescriber Pharmacy Payment REFILL #/Auth State Detail  1 [id number] ** 04/03/2025 04/02/2025 Mixed Amphetamine Salts (Capsule, Extended Release) 30.0 30 20 MG NA [place]) [facility] [place] / 0 [place]   1 [id number] ** 04/03/2025 04/02/2025 Amphetamine Salt Combo (Tablet) 30.0 30 20 MG NA [facility] [place] 0 [place]   1 [id number] 12/13/2024 11/27/2024 11/27/2024 Mixed Amphetamine Salts (Capsule, Extended Release) 30.0 30 20 MG NA [place]) [facility] [place] 0 / 0 [place]   1 [id number] 12/13/2024 11/27/2024 11/27/2024 Amphetamine Salt Combo (Tablet) 30.0 30 20 MG NA [place])  [facility] Commercial [place] 0 / 0 [place]   1 [id number] 10/07/2024 10/02/2024 10/01/2024 Amphetamine Salt Combo (Tablet) 30.0 30 20 MG NA [place]) [facility] Commercial [place] 0 / 0 [place]   1 [id number] 10/07/2024 10/02/2024 10/01/2024 Mixed Amphetamine Salts (Capsule, Extended Release) 30.0 30 20 MG NA [place]) [facility] Commercial [place] 0 / 0 [place]   1 [id number] 07/16/2024 07/16/2024 07/16/2024 Amphetamine Salt Combo (Tablet) 30.0 30 20 MG NA [place]) [facility] Commercial [place] 0 / 0 [place]   1 [id number] 07/16/2024 07/16/2024 07/16/2024 Mixed Amphetamine Salts (Capsule, Extended Release) 30.0 30 20 MG ASPEN PALOMINO) [facility]

## 2025-06-16 DIAGNOSIS — R23.8 SCALP IRRITATION: ICD-10-CM

## 2025-06-17 RX ORDER — CLOBETASOL PROPIONATE 0.5 MG/ML
SOLUTION TOPICAL 2 TIMES DAILY
Qty: 50 ML | Refills: 1 | Status: SHIPPED | OUTPATIENT
Start: 2025-06-17

## 2025-06-18 ENCOUNTER — TELEMEDICINE (OUTPATIENT)
Dept: OTHER | Facility: HOSPITAL | Age: 30
End: 2025-06-18
Payer: COMMERCIAL

## 2025-06-18 ENCOUNTER — PATIENT MESSAGE (OUTPATIENT)
Dept: FAMILY MEDICINE CLINIC | Facility: CLINIC | Age: 30
End: 2025-06-18

## 2025-06-18 DIAGNOSIS — B02.9 HERPES ZOSTER WITHOUT COMPLICATION: Primary | ICD-10-CM

## 2025-06-18 PROCEDURE — 99213 OFFICE O/P EST LOW 20 MIN: CPT | Performed by: PHYSICIAN ASSISTANT

## 2025-06-18 RX ORDER — VALACYCLOVIR HYDROCHLORIDE 1 G/1
1000 TABLET, FILM COATED ORAL 3 TIMES DAILY
Qty: 21 TABLET | Refills: 0 | Status: SHIPPED | OUTPATIENT
Start: 2025-06-18 | End: 2025-06-25

## 2025-06-18 NOTE — ASSESSMENT & PLAN NOTE
Orders:    valACYclovir (VALTREX) 1,000 mg tablet; Take 1 tablet (1,000 mg total) by mouth 3 (three) times a day for 7 days

## 2025-06-18 NOTE — PATIENT COMMUNICATION
She was sent Valtrex today during telemedicine visit. If it does not improve or if she has any other concerns please let us know.

## 2025-06-18 NOTE — PROGRESS NOTES
Virtual Regular Visit  Name: Christel Murray      : 1995      MRN: [id number]  Encounter Provider: Dee Tsagn PA-C  Encounter Date: 2025   Encounter department: VIRTUAL CARE   :  Assessment & Plan  Herpes zoster without complication    Orders:    valACYclovir (VALTREX) 1,000 mg tablet; Take 1 tablet (1,000 mg total) by mouth 3 (three) times a day for 7 days      Hard to visualize the rash, it is noted in prior pcp notes that she get shingles rash yearly at this time and is treated with valtrex.  Will treat with valtrex and follow up with PCP  History of Present Illness     Patient states that she get shingles. [place] beach and is starting to have a flare up a couple hours ago.  She does get this every spring.   She states the rash is on her back.  She states the rash is starting. It is itching.         Review of Systems   Constitutional: Negative.    HENT: Negative.     Respiratory: Negative.     Cardiovascular: Negative.    Gastrointestinal: Negative.    Musculoskeletal: Negative.    Skin:  Positive for rash.   Psychiatric/Behavioral: Negative.         Objective   There were no vitals taken for this visit.    Physical Exam  Constitutional:       General: She is not in acute distress.     Appearance: Normal appearance. She is not ill-appearing, toxic-appearing or diaphoretic.   HENT:      Head: Normocephalic and atraumatic.   Pulmonary:      Effort: Pulmonary effort is normal.     Skin:     Findings: Rash present.      Comments: Erythema behind the knee, unable to visualize back on video call.      Neurological:      General: No focal deficit present.      Mental Status: She is alert and oriented to person, place, and time.     Psychiatric:         Mood and Affect: Mood normal.         Behavior: Behavior normal.         Administrative Statements   Encounter provider Dee Tsang PA-C    The Patient is located at Home and in the following state in which I hold an active license  [place].    The patient was identified by name and date of birth. Christel Murray was informed that this is a telemedicine visit and that the visit is being conducted through the Epic Embedded platform. She agrees to proceed..  My office door was closed. No one else was in the room.  She acknowledged consent and understanding of privacy and security of the video platform. The patient has agreed to participate and understands they can discontinue the visit at any time.    I have spent a total time of 5 minutes in caring for this patient on the day of the visit/encounter including Documenting in the medical record, Reviewing/placing orders in the medical record (including tests, medications, and/or procedures), and Obtaining or reviewing history  , not including the time spent for establishing the audio/video connection.

## 2025-06-21 ENCOUNTER — PATIENT MESSAGE (OUTPATIENT)
Dept: FAMILY MEDICINE CLINIC | Facility: CLINIC | Age: 30
End: 2025-06-21

## 2025-06-23 NOTE — PATIENT COMMUNICATION
Can she upload photos to [employer] or come in for an appointment so we can better evaluate the rash?

## 2025-06-23 NOTE — PATIENT COMMUNICATION
Pt has been taking Valtrex as prescribed but has not noticed any improvement or decrease in symptoms.     Please advise

## 2025-07-15 ENCOUNTER — OFFICE VISIT (OUTPATIENT)
Dept: FAMILY MEDICINE CLINIC | Facility: CLINIC | Age: 30
End: 2025-07-15
Payer: COMMERCIAL

## 2025-07-15 ENCOUNTER — NURSE TRIAGE (OUTPATIENT)
Age: 30
End: 2025-07-15

## 2025-07-15 ENCOUNTER — TELEPHONE (OUTPATIENT)
Age: 30
End: 2025-07-15

## 2025-07-15 VITALS
DIASTOLIC BLOOD PRESSURE: 70 MMHG | WEIGHT: 175 LBS | RESPIRATION RATE: 16 BRPM | OXYGEN SATURATION: 96 % | HEIGHT: 62 IN | TEMPERATURE: 98.6 F | SYSTOLIC BLOOD PRESSURE: 110 MMHG | BODY MASS INDEX: 32.2 KG/M2 | HEART RATE: 94 BPM

## 2025-07-15 DIAGNOSIS — J30.89 ALLERGIC RHINITIS DUE TO OTHER ALLERGIC TRIGGER, UNSPECIFIED SEASONALITY: ICD-10-CM

## 2025-07-15 DIAGNOSIS — E66.9 OBESITY (BMI 30-39.9): Primary | ICD-10-CM

## 2025-07-15 DIAGNOSIS — B02.9 HERPES ZOSTER WITHOUT COMPLICATION: ICD-10-CM

## 2025-07-15 DIAGNOSIS — F90.0 ATTENTION DEFICIT HYPERACTIVITY DISORDER (ADHD), PREDOMINANTLY INATTENTIVE TYPE: ICD-10-CM

## 2025-07-15 DIAGNOSIS — K21.9 GASTROESOPHAGEAL REFLUX DISEASE WITHOUT ESOPHAGITIS: ICD-10-CM

## 2025-07-15 DIAGNOSIS — E66.9 OBESITY (BMI 30-39.9): ICD-10-CM

## 2025-07-15 PROCEDURE — 99214 OFFICE O/P EST MOD 30 MIN: CPT | Performed by: FAMILY MEDICINE

## 2025-07-15 RX ORDER — FLUTICASONE PROPIONATE 50 MCG
2 SPRAY, SUSPENSION (ML) NASAL DAILY
Qty: 16 ML | Refills: 3 | Status: SHIPPED | OUTPATIENT
Start: 2025-07-15 | End: 2025-07-15 | Stop reason: CLARIF

## 2025-07-15 RX ORDER — VALACYCLOVIR HYDROCHLORIDE 1 G/1
1000 TABLET, FILM COATED ORAL 3 TIMES DAILY
Qty: 21 TABLET | Refills: 0 | Status: SHIPPED | OUTPATIENT
Start: 2025-07-15 | End: 2025-07-15 | Stop reason: CLARIF

## 2025-07-15 RX ORDER — PREDNISONE 50 MG/1
50 TABLET ORAL DAILY
Qty: 5 TABLET | Refills: 0 | Status: SHIPPED | OUTPATIENT
Start: 2025-07-15 | End: 2025-07-15 | Stop reason: CLARIF

## 2025-07-15 RX ORDER — TIRZEPATIDE 7.5 MG/.5ML
7.5 INJECTION, SOLUTION SUBCUTANEOUS WEEKLY
Qty: 2 ML | Refills: 0 | Status: SHIPPED | OUTPATIENT
Start: 2025-07-15

## 2025-07-15 RX ORDER — DEXTROAMPHETAMINE SACCHARATE, AMPHETAMINE ASPARTATE, DEXTROAMPHETAMINE SULFATE AND AMPHETAMINE SULFATE 5; 5; 5; 5 MG/1; MG/1; MG/1; MG/1
TABLET ORAL
Qty: 30 TABLET | Refills: 0 | Status: SHIPPED | OUTPATIENT
Start: 2025-07-15 | End: 2025-07-15 | Stop reason: SDUPTHER

## 2025-07-15 RX ORDER — FLUTICASONE PROPIONATE 50 MCG
2 SPRAY, SUSPENSION (ML) NASAL DAILY
Qty: 16 ML | Refills: 3 | Status: SHIPPED | OUTPATIENT
Start: 2025-07-15

## 2025-07-15 RX ORDER — PREDNISONE 50 MG/1
50 TABLET ORAL DAILY
Qty: 5 TABLET | Refills: 0 | Status: SHIPPED | OUTPATIENT
Start: 2025-07-15 | End: 2025-07-20

## 2025-07-15 RX ORDER — DEXTROAMPHETAMINE SACCHARATE, AMPHETAMINE ASPARTATE MONOHYDRATE, DEXTROAMPHETAMINE SULFATE AND AMPHETAMINE SULFATE 5; 5; 5; 5 MG/1; MG/1; MG/1; MG/1
20 CAPSULE, EXTENDED RELEASE ORAL EVERY MORNING
Qty: 30 CAPSULE | Refills: 0 | Status: SHIPPED | OUTPATIENT
Start: 2025-07-15 | End: 2025-07-15 | Stop reason: SDUPTHER

## 2025-07-15 RX ORDER — VALACYCLOVIR HYDROCHLORIDE 1 G/1
1000 TABLET, FILM COATED ORAL 3 TIMES DAILY
Qty: 21 TABLET | Refills: 0 | Status: SHIPPED | OUTPATIENT
Start: 2025-07-15 | End: 2025-07-22

## 2025-07-15 RX ORDER — TIRZEPATIDE 7.5 MG/.5ML
7.5 INJECTION, SOLUTION SUBCUTANEOUS WEEKLY
Qty: 2 ML | Refills: 0 | Status: SHIPPED | OUTPATIENT
Start: 2025-07-15 | End: 2025-07-15 | Stop reason: CLARIF

## 2025-07-16 RX ORDER — DEXTROAMPHETAMINE SACCHARATE, AMPHETAMINE ASPARTATE MONOHYDRATE, DEXTROAMPHETAMINE SULFATE AND AMPHETAMINE SULFATE 5; 5; 5; 5 MG/1; MG/1; MG/1; MG/1
20 CAPSULE, EXTENDED RELEASE ORAL EVERY MORNING
Qty: 30 CAPSULE | Refills: 0 | Status: SHIPPED | OUTPATIENT
Start: 2025-07-16

## 2025-07-16 RX ORDER — DEXTROAMPHETAMINE SACCHARATE, AMPHETAMINE ASPARTATE, DEXTROAMPHETAMINE SULFATE AND AMPHETAMINE SULFATE 5; 5; 5; 5 MG/1; MG/1; MG/1; MG/1
TABLET ORAL
Qty: 30 TABLET | Refills: 0 | Status: SHIPPED | OUTPATIENT
Start: 2025-07-16

## 2025-07-16 NOTE — TELEPHONE ENCOUNTER
Pt last seen 7/15/25. Pt stating Adderall 20mg needs to be resent to  pharmacy.    Patients  Select Patient Id Name  Gender [facility] State   1 BRENNON MADHURI 1995 F [id number] [id number] [place]   2 BRENNON MADHURI 1995 F [id number] [place]      Search Criteria  Name Date of Birth Date Range  brennon aranda 1995 To 2025  Requester Name Requested Date  [facility] 2025 16:54:49 ([facility])  Summary  Prescriptions  20  Prescribers  2  Pharmacies  2  Drug Classes  Benzodiazepines  0  Stimulants  20  Opioids  0  Muscle Relaxants  0  Opioid Dosage  Total MME for Active Prescriptions  0    Average MME  0.00         Prescriptions  Notifications    Prescribers  Pharmacies  MME Graph    Show All     [place]   Drug Categories:      Stimulants     Show  10  entries  Search:  Patient Id Prescription # Sold Filled Written Drug Label Qty Days Strength MME* Prescriber Pharmacy Payment REFILL #/Auth State Detail  1 [id number] ** 2025 06/10/2025 Amphetamine Salt Combo (Tablet) 30.0 30 20 MG NA [place]) [facility] [place] 0 / 0 [place]   1 [id number] ** 2025 06/10/2025 Mixed Amphetamine Salts (Capsule, Extended Release) 30.0 30 20 MG NA [place]) [facility] [place] 0 / 0 [place]   1 [id number] ** 2025 Mixed Amphetamine Salts (Capsule, Extended Release) 30.0 30 20 MG NA [place]) [facility] [place] 0 / 0 [place]   1 [id number] ** 2025 Amphetamine Salt Combo (Tablet) 30.0 30 20 MG NA ANAM(MD) [facility] [place] 0 / 0 [place]   1 [id number] 2024 Mixed Amphetamine Salts (Capsule, Extended Release) 30.0 30 20 MG NA [place]) [facility] [place] 0 / 0 [place]   1 [id number] 20242024 Amphetamine Salt Combo (Tablet) 30.0 30 20 MG NA [facility]) [facility] [place] 0 / 0 [place]   1 [id number] 10/07/2024 10/02/2024 10/01/2024 Amphetamine Salt Combo (Tablet) 30.0 30 20 MG NA [place]) [facility] [place] 0 / 0 [place]   1 [id number] 10/07/2024 10/02/2024 10/01/2024 Mixed Amphetamine Salts (Capsule, Extended Release) 30.0 30 20 MG NA [place]) [facility] [place] 0 / 0 [place]   1 [id number] 07/16/2024 07/16/2024 07/16/2024 Amphetamine Salt Combo (Tablet) 30.0 30 20 MG NA [place]) [facility] [place] 0 / 0 [place]   1 [id number] 07/16/2024 07/16/2024 07/16/2024 Mixed Amphetamine Salts (Capsule, Extended Release) 30.0 30 20 MG NA [facility]

## 2025-07-31 DIAGNOSIS — K21.9 GASTROESOPHAGEAL REFLUX DISEASE WITHOUT ESOPHAGITIS: ICD-10-CM

## 2025-08-01 RX ORDER — PANTOPRAZOLE SODIUM 40 MG/1
40 TABLET, DELAYED RELEASE ORAL
Qty: 100 TABLET | Refills: 1 | Status: SHIPPED | OUTPATIENT
Start: 2025-08-01

## 2025-08-21 ENCOUNTER — OFFICE VISIT (OUTPATIENT)
Dept: OBGYN CLINIC | Facility: CLINIC | Age: 30
End: 2025-08-21
Payer: COMMERCIAL

## 2025-08-21 ENCOUNTER — TELEPHONE (OUTPATIENT)
Age: 30
End: 2025-08-21

## 2025-08-21 VITALS
BODY MASS INDEX: 31.83 KG/M2 | DIASTOLIC BLOOD PRESSURE: 74 MMHG | HEIGHT: 62 IN | SYSTOLIC BLOOD PRESSURE: 114 MMHG | WEIGHT: 173 LBS

## 2025-08-21 DIAGNOSIS — T83.32XA MALPOSITIONED INTRAUTERINE DEVICE (IUD), INITIAL ENCOUNTER: ICD-10-CM

## 2025-08-21 DIAGNOSIS — Z01.419 ENCOUNTER FOR GYNECOLOGICAL EXAMINATION WITHOUT ABNORMAL FINDING: Primary | ICD-10-CM

## 2025-08-21 PROBLEM — B37.31 YEAST VAGINITIS: Status: RESOLVED | Noted: 2025-05-22 | Resolved: 2025-08-21

## 2025-08-21 PROBLEM — Z00.01 ABNORMAL WELLNESS EXAM: Status: RESOLVED | Noted: 2024-03-08 | Resolved: 2025-08-21

## 2025-08-21 PROBLEM — Z48.02 ENCOUNTER FOR REMOVAL OF SUTURES: Status: RESOLVED | Noted: 2025-05-07 | Resolved: 2025-08-21

## 2025-08-21 PROCEDURE — S0612 ANNUAL GYNECOLOGICAL EXAMINA: HCPCS | Performed by: OBSTETRICS & GYNECOLOGY

## 2025-08-21 PROCEDURE — 58301 REMOVE INTRAUTERINE DEVICE: CPT | Performed by: OBSTETRICS & GYNECOLOGY
